# Patient Record
Sex: FEMALE | Race: WHITE | URBAN - METROPOLITAN AREA
[De-identification: names, ages, dates, MRNs, and addresses within clinical notes are randomized per-mention and may not be internally consistent; named-entity substitution may affect disease eponyms.]

---

## 2020-07-22 ENCOUNTER — TELEPHONE (OUTPATIENT)
Dept: ENDOCRINOLOGY | Age: 60
End: 2020-07-22

## 2020-08-10 ENCOUNTER — TELEPHONE (OUTPATIENT)
Dept: ENDOCRINOLOGY | Age: 60
End: 2020-08-10

## 2020-08-10 RX ORDER — ASPIRIN 81 MG/1
81 TABLET ORAL DAILY
COMMUNITY

## 2020-08-10 RX ORDER — CHLORAL HYDRATE 500 MG
2 CAPSULE ORAL DAILY
COMMUNITY

## 2020-08-10 RX ORDER — ATORVASTATIN CALCIUM 20 MG/1
TABLET, FILM COATED ORAL
COMMUNITY
Start: 2020-07-21

## 2020-08-10 SDOH — HEALTH STABILITY: MENTAL HEALTH: HOW OFTEN DO YOU HAVE A DRINK CONTAINING ALCOHOL?: 2-4 TIMES A MONTH

## 2020-09-02 ENCOUNTER — OFFICE VISIT (OUTPATIENT)
Dept: ENDOCRINOLOGY | Age: 60
End: 2020-09-02
Payer: COMMERCIAL

## 2020-09-02 VITALS
WEIGHT: 120.4 LBS | HEIGHT: 64 IN | SYSTOLIC BLOOD PRESSURE: 95 MMHG | BODY MASS INDEX: 20.55 KG/M2 | DIASTOLIC BLOOD PRESSURE: 62 MMHG

## 2020-09-02 PROBLEM — M81.0 OSTEOPOROSIS, POSTMENOPAUSAL: Status: ACTIVE | Noted: 2020-09-02

## 2020-09-02 PROCEDURE — 99205 OFFICE O/P NEW HI 60 MIN: CPT | Performed by: INTERNAL MEDICINE

## 2020-09-02 RX ORDER — ALENDRONATE SODIUM 70 MG/1
70 TABLET ORAL WEEKLY
Qty: 12 TABLET | Refills: 4 | Status: SHIPPED | OUTPATIENT
Start: 2020-09-02

## 2020-09-02 NOTE — LETTER
[facility]  [facility] [address] [facility], [address] [place] [address],[place] [address]  Phone [phone]  Fax [phone]         2020         King Bowden NP                            Re:  France Scott,  1960    Dear Ms. Mccann Phlegm:     Thank you for asking me to see France Chavez in consultation. As you know, Ms. Ranjit Paulino is a 56 y.o. woman found to have osteoporosis in 2018. BMD decreased slightly 6158-7473. We reviewed life-style issues (calcium, vitamin D and physical activity). I have ordered some diagnostic tests and will be back in touch when I have the results. Without effective treatment, fracture risk is high. We discussed long-term treatment options. She selected alendronate 70 mg weekly. Rx sent, dosing instructions reviewed. Enclosed is a copy of my consultation note. Please let me know if you have any questions. Sincerely,    Lauren Fonseca. Erwin SMITH     Encl.  Copy of consult note

## 2020-09-02 NOTE — PROGRESS NOTES
adult height 65. 1.25 height loss. Usual weight 120#. No recent significant change in weight. PHYSICAL EXAMINATION. GENERAL. Well-nourished, well-developed, normally proportioned adult. MENTAL STATUS. Pleasant mood. Oriented to time, place, and person. ORAL. Teeth appear to be in good condition. SKIN. Normal texture and turgor. LUNGS. Clear to auscultation. Breath sounds normal.  HEART. Heart sounds normal, no murmur or gallop. MUSCULOSKELETAL. The examination included inspection/palpation (any misalignment, asymmetry, crepitation, defects, tenderness, masses, effusions is noted), assessment of range of motion (any presence of pain, crepitation, contracture is noted), assessment of stability (any dislocation, subluxation, laxity is noted), assessment of muscle strength and tone (any atrophy or abnormal movements is noted). Pelvis appears normal.  Scoliosis. No spine tenderness to palpation or percussion. One finger space on the left, two on the right, between ribs and pelvis. Gait steady without assistance. NEUROLOGICAL. Able to rise from chair without using arms. No apparent motor or sensory deficit. Coordination appears normal     BONE DENSITY. Most recent done at [employer] equipment. They deleted L4 in 2020 but I think vertebral levels were mislabeled. T-scores   Initial study: 01/04/2018 L1-L3 -2.5 right fem. neck -2.8   Current study: 06/20/2020 L1-L3 -2.8  right fem. neck -3.0     The table below shows bone mineral density (grams/cm2), the appropriate measure for comparing serial scans. A significant increase or decrease is based on precision studies done at our center according to the ISCD protocol with a least significant change of 0.030 g/cm2. PA spine Proximal Femur (right)   Date L1-L3 Fem. neck Trochanter Total hip   01/04/2018 0.744 0.534 0.487 0.677   06/20/2020 0.714 0.522 0.450 0.651     Labs: 06/2020 Ca 9.6 Cr 0.8.   Imaging: DXA printouts reviewed. ASSESSMENT. Osteoporosis, bone density lower than desirable. In the spine, BMD decreased 6032-0504. Without effective treatment, fracture risk is high. DIAGNOSTIC PLANS. In 2 weeks (or longer), on appropriate calcium intake,  24-hour urine for calcium, creatinine and sodium. I will be in touch with the patient to review lab results. THERAPEUTIC PLANS. Calcium, target 1200 mg daily; we discussed recommended calcium intake and the effects of excessive calcium intake from supplements. I provided a handout with information about how to calculate daily calcium intake. Advised to stop calcium supplements. Advised to either should either stop the multivitamin or change to one without calcium. Vitamin D, advised to add vitamin D 2000 IU/d to make up for what will be lost when she stops calcium and MVI. Exercise, Recommended weight-bearing exercise (walking or equivalent) 30-40 minutes per session, 3 or 4 sessions a week and resistance exercise. Advised to take care to avoid injury (high impact, falling, etc). We discussed avoiding activities that place compressive forces on the spine; specifically pushing, pulling, bending, lifting and twisting to help prevent vertebral fractures. Pharmacologic therapy, we discussed long-term treatment options for osteoporosis that have evidence for broad spectrum anti-fracture efficacy (reduce the risk of vertebral, hip and other nonvertebral fractures); alendronate (Fosamax), zoledronate (Reclast) and Prolia (denosumab). I explained dosing instructions, the mechanism of action, side effects (which are uncommon) and safety concerns (which are rare). I would also consider risedronate (Actonel) but it is rarely covered by insurance. She selected alendronate 70 mg weekly. Rx sent, dosing instructions reviewed. Return appointment with DXA in 1 year. I spent 60 minutes face to face with this patient.  Over 50% of that time was spent on counseling and care coordination. See assessment and plan for counseling and care coordination details. Stephanie Hood MD, Director, [facility] ([facility]) Osteoporosis and Bone Health Services    CC:  Tonia Paris NP

## 2020-09-25 ENCOUNTER — HOSPITAL ENCOUNTER (OUTPATIENT)
Age: 60
Setting detail: SPECIMEN
Discharge: HOME | End: 2020-09-25
Payer: COMMERCIAL

## 2020-09-25 LAB
24HR URINE VOLUME (ML): 1400 ML
CALCIUM 24 HOUR URINE: 165 MG/24 HR (ref 42–353)
CREATININE 24 HOUR URINE: 0.9 G/24HR (ref 0.6–1.5)
SODIUM 24 HOUR URINE: 112 MMOL/24 HR (ref 40–220)

## 2020-09-25 PROCEDURE — 84300 ASSAY OF URINE SODIUM: CPT

## 2020-09-25 PROCEDURE — 82340 ASSAY OF CALCIUM IN URINE: CPT

## 2022-01-06 ENCOUNTER — TRANSCRIBE ORDERS (OUTPATIENT)
Dept: ADMINISTRATIVE | Facility: HOSPITAL | Age: 62
End: 2022-01-06

## 2022-01-06 DIAGNOSIS — Z12.31 VISIT FOR SCREENING MAMMOGRAM: Primary | ICD-10-CM

## 2022-02-24 ENCOUNTER — HOSPITAL ENCOUNTER (OUTPATIENT)
Dept: MAMMOGRAPHY | Facility: HOSPITAL | Age: 62
Discharge: HOME | End: 2022-02-24
Admitting: STUDENT IN AN ORGANIZED HEALTH CARE EDUCATION/TRAINING PROGRAM

## 2022-02-24 ENCOUNTER — APPOINTMENT (OUTPATIENT)
Dept: OTHER | Facility: HOSPITAL | Age: 62
End: 2022-02-24

## 2022-02-24 DIAGNOSIS — Z12.31 VISIT FOR SCREENING MAMMOGRAM: ICD-10-CM

## 2022-02-24 PROCEDURE — 77067 SCR MAMMO BI INCL CAD: CPT | Performed by: RADIOLOGY

## 2022-02-24 PROCEDURE — 77067 SCR MAMMO BI INCL CAD: CPT

## 2022-02-24 PROCEDURE — 77063 BREAST TOMOSYNTHESIS BI: CPT | Performed by: RADIOLOGY

## 2022-02-24 PROCEDURE — 77063 BREAST TOMOSYNTHESIS BI: CPT

## 2022-03-09 ENCOUNTER — APPOINTMENT (OUTPATIENT)
Dept: MAMMOGRAPHY | Facility: HOSPITAL | Age: 62
End: 2022-03-09

## 2022-03-31 ENCOUNTER — TRANSCRIBE ORDERS (OUTPATIENT)
Dept: ADMINISTRATIVE | Facility: HOSPITAL | Age: 62
End: 2022-03-31

## 2022-03-31 DIAGNOSIS — N95.1 MENOPAUSAL STATE: Primary | ICD-10-CM

## 2022-04-18 RX ORDER — ALENDRONATE SODIUM 70 MG/1
70 TABLET ORAL WEEKLY
Qty: 12 TABLET | Refills: 4 | OUTPATIENT
Start: 2022-04-18

## 2022-07-21 ENCOUNTER — HOSPITAL ENCOUNTER (OUTPATIENT)
Dept: BONE DENSITY | Facility: HOSPITAL | Age: 62
Discharge: HOME | End: 2022-07-21

## 2022-07-21 DIAGNOSIS — N95.1 MENOPAUSAL STATE: ICD-10-CM

## 2022-07-21 PROCEDURE — 77080 DXA BONE DENSITY AXIAL: CPT

## 2022-09-25 ENCOUNTER — APPOINTMENT (OUTPATIENT)
Dept: CT IMAGING | Facility: HOSPITAL | Age: 62
End: 2022-09-25

## 2022-09-25 ENCOUNTER — APPOINTMENT (OUTPATIENT)
Dept: MRI IMAGING | Facility: HOSPITAL | Age: 62
End: 2022-09-25

## 2022-09-25 ENCOUNTER — HOSPITAL ENCOUNTER (EMERGENCY)
Facility: HOSPITAL | Age: 62
Discharge: HOME | End: 2022-09-26
Attending: EMERGENCY MEDICINE | Admitting: EMERGENCY MEDICINE

## 2022-09-25 DIAGNOSIS — Z90.710 HISTORY OF TOTAL HYSTERECTOMY: ICD-10-CM

## 2022-09-25 DIAGNOSIS — F41.9 ANXIETY: ICD-10-CM

## 2022-09-25 DIAGNOSIS — G89.29 CHRONIC PELVIC PAIN IN FEMALE: Primary | ICD-10-CM

## 2022-09-25 DIAGNOSIS — Z87.39 HISTORY OF OSTEOPOROSIS: ICD-10-CM

## 2022-09-25 DIAGNOSIS — G47.9 SLEEP DISTURBANCE: ICD-10-CM

## 2022-09-25 DIAGNOSIS — Z86.39 HISTORY OF HYPERLIPIDEMIA: ICD-10-CM

## 2022-09-25 DIAGNOSIS — M51.36 DEGENERATIVE DISC DISEASE, LUMBAR: ICD-10-CM

## 2022-09-25 DIAGNOSIS — R10.2 CHRONIC PELVIC PAIN IN FEMALE: Primary | ICD-10-CM

## 2022-09-25 DIAGNOSIS — Z87.440 HISTORY OF UTI: ICD-10-CM

## 2022-09-25 LAB
ALBUMIN SERPL-MCNC: 4.6 G/DL (ref 3.5–5.2)
ALBUMIN/GLOB SERPL: 1.8 G/DL
ALP SERPL-CCNC: 68 U/L (ref 39–117)
ALT SERPL W P-5'-P-CCNC: 23 U/L (ref 1–33)
ANION GAP SERPL CALCULATED.3IONS-SCNC: 10 MMOL/L (ref 5–15)
AST SERPL-CCNC: 33 U/L (ref 1–32)
BACTERIA UR QL AUTO: ABNORMAL /HPF
BASOPHILS # BLD AUTO: 0.03 10*3/MM3 (ref 0–0.2)
BASOPHILS NFR BLD AUTO: 0.7 % (ref 0–1.5)
BILIRUB SERPL-MCNC: 0.2 MG/DL (ref 0–1.2)
BILIRUB UR QL STRIP: NEGATIVE
BUN SERPL-MCNC: 13 MG/DL (ref 8–23)
BUN/CREAT SERPL: 17.1 (ref 7–25)
CALCIUM SPEC-SCNC: 9.7 MG/DL (ref 8.6–10.5)
CHLORIDE SERPL-SCNC: 99 MMOL/L (ref 98–107)
CLARITY UR: ABNORMAL
CLUE CELLS SPEC QL WET PREP: ABNORMAL
CO2 SERPL-SCNC: 31 MMOL/L (ref 22–29)
COLOR UR: YELLOW
CREAT SERPL-MCNC: 0.76 MG/DL (ref 0.57–1)
DEPRECATED RDW RBC AUTO: 40.6 FL (ref 37–54)
EGFRCR SERPLBLD CKD-EPI 2021: 87.6 ML/MIN/1.73
EOSINOPHIL # BLD AUTO: 0.07 10*3/MM3 (ref 0–0.4)
EOSINOPHIL NFR BLD AUTO: 1.6 % (ref 0.3–6.2)
ERYTHROCYTE [DISTWIDTH] IN BLOOD BY AUTOMATED COUNT: 11.9 % (ref 12.3–15.4)
GLOBULIN UR ELPH-MCNC: 2.6 GM/DL
GLUCOSE SERPL-MCNC: 110 MG/DL (ref 65–99)
GLUCOSE UR STRIP-MCNC: NEGATIVE MG/DL
HCT VFR BLD AUTO: 42 % (ref 34–46.6)
HGB BLD-MCNC: 14 G/DL (ref 12–15.9)
HGB UR QL STRIP.AUTO: ABNORMAL
HOLD SPECIMEN: NORMAL
HYALINE CASTS UR QL AUTO: ABNORMAL /LPF
HYDATID CYST SPEC WET PREP: ABNORMAL
IMM GRANULOCYTES # BLD AUTO: 0.01 10*3/MM3 (ref 0–0.05)
IMM GRANULOCYTES NFR BLD AUTO: 0.2 % (ref 0–0.5)
KETONES UR QL STRIP: NEGATIVE
KOH PREP NAIL: NORMAL
LEUKOCYTE ESTERASE UR QL STRIP.AUTO: ABNORMAL
LYMPHOCYTES # BLD AUTO: 0.93 10*3/MM3 (ref 0.7–3.1)
LYMPHOCYTES NFR BLD AUTO: 21.2 % (ref 19.6–45.3)
MCH RBC QN AUTO: 30.8 PG (ref 26.6–33)
MCHC RBC AUTO-ENTMCNC: 33.3 G/DL (ref 31.5–35.7)
MCV RBC AUTO: 92.5 FL (ref 79–97)
MONOCYTES # BLD AUTO: 0.58 10*3/MM3 (ref 0.1–0.9)
MONOCYTES NFR BLD AUTO: 13.2 % (ref 5–12)
NEUTROPHILS NFR BLD AUTO: 2.77 10*3/MM3 (ref 1.7–7)
NEUTROPHILS NFR BLD AUTO: 63.1 % (ref 42.7–76)
NITRITE UR QL STRIP: NEGATIVE
NRBC BLD AUTO-RTO: 0 /100 WBC (ref 0–0.2)
PH UR STRIP.AUTO: 7 [PH] (ref 5–8)
PLATELET # BLD AUTO: 195 10*3/MM3 (ref 140–450)
PMV BLD AUTO: 10 FL (ref 6–12)
POTASSIUM SERPL-SCNC: 4.7 MMOL/L (ref 3.5–5.2)
PROT SERPL-MCNC: 7.2 G/DL (ref 6–8.5)
PROT UR QL STRIP: NEGATIVE
RBC # BLD AUTO: 4.54 10*6/MM3 (ref 3.77–5.28)
RBC # UR STRIP: ABNORMAL /HPF
REF LAB TEST METHOD: ABNORMAL
SODIUM SERPL-SCNC: 140 MMOL/L (ref 136–145)
SP GR UR STRIP: 1.02 (ref 1–1.03)
SQUAMOUS #/AREA URNS HPF: ABNORMAL /HPF
T VAGINALIS SPEC QL WET PREP: ABNORMAL
UROBILINOGEN UR QL STRIP: ABNORMAL
WBC # UR STRIP: ABNORMAL /HPF
WBC NRBC COR # BLD: 4.39 10*3/MM3 (ref 3.4–10.8)
WBC SPEC QL WET PREP: ABNORMAL
WHOLE BLOOD HOLD COAG: NORMAL
WHOLE BLOOD HOLD SPECIMEN: NORMAL
YEAST GENITAL QL WET PREP: ABNORMAL

## 2022-09-25 PROCEDURE — 99284 EMERGENCY DEPT VISIT MOD MDM: CPT

## 2022-09-25 PROCEDURE — 72158 MRI LUMBAR SPINE W/O & W/DYE: CPT

## 2022-09-25 PROCEDURE — 87077 CULTURE AEROBIC IDENTIFY: CPT | Performed by: PHYSICIAN ASSISTANT

## 2022-09-25 PROCEDURE — 25010000002 KETOROLAC TROMETHAMINE PER 15 MG: Performed by: PHYSICIAN ASSISTANT

## 2022-09-25 PROCEDURE — 87086 URINE CULTURE/COLONY COUNT: CPT | Performed by: PHYSICIAN ASSISTANT

## 2022-09-25 PROCEDURE — 74176 CT ABD & PELVIS W/O CONTRAST: CPT

## 2022-09-25 PROCEDURE — 85025 COMPLETE CBC W/AUTO DIFF WBC: CPT

## 2022-09-25 PROCEDURE — 36415 COLL VENOUS BLD VENIPUNCTURE: CPT

## 2022-09-25 PROCEDURE — A9577 INJ MULTIHANCE: HCPCS | Performed by: EMERGENCY MEDICINE

## 2022-09-25 PROCEDURE — 80053 COMPREHEN METABOLIC PANEL: CPT

## 2022-09-25 PROCEDURE — 96374 THER/PROPH/DIAG INJ IV PUSH: CPT

## 2022-09-25 PROCEDURE — 81001 URINALYSIS AUTO W/SCOPE: CPT | Performed by: EMERGENCY MEDICINE

## 2022-09-25 PROCEDURE — 87220 TISSUE EXAM FOR FUNGI: CPT | Performed by: PHYSICIAN ASSISTANT

## 2022-09-25 PROCEDURE — 0 GADOBENATE DIMEGLUMINE 529 MG/ML SOLUTION: Performed by: EMERGENCY MEDICINE

## 2022-09-25 PROCEDURE — 87210 SMEAR WET MOUNT SALINE/INK: CPT | Performed by: PHYSICIAN ASSISTANT

## 2022-09-25 RX ORDER — MULTIVITAMIN WITH IRON
250 TABLET ORAL DAILY
COMMUNITY

## 2022-09-25 RX ORDER — SODIUM CHLORIDE 0.9 % (FLUSH) 0.9 %
10 SYRINGE (ML) INJECTION AS NEEDED
Status: DISCONTINUED | OUTPATIENT
Start: 2022-09-25 | End: 2022-09-26 | Stop reason: HOSPADM

## 2022-09-25 RX ORDER — KETOROLAC TROMETHAMINE 30 MG/ML
30 INJECTION, SOLUTION INTRAMUSCULAR; INTRAVENOUS ONCE
Status: COMPLETED | OUTPATIENT
Start: 2022-09-25 | End: 2022-09-25

## 2022-09-25 RX ADMIN — KETOROLAC TROMETHAMINE 30 MG: 30 INJECTION, SOLUTION INTRAMUSCULAR; INTRAVENOUS at 21:52

## 2022-09-25 RX ADMIN — GADOBENATE DIMEGLUMINE 10 ML: 529 INJECTION, SOLUTION INTRAVENOUS at 23:36

## 2022-09-26 VITALS
WEIGHT: 117 LBS | TEMPERATURE: 97.8 F | BODY MASS INDEX: 19.97 KG/M2 | HEART RATE: 60 BPM | DIASTOLIC BLOOD PRESSURE: 76 MMHG | RESPIRATION RATE: 17 BRPM | HEIGHT: 64 IN | SYSTOLIC BLOOD PRESSURE: 132 MMHG | OXYGEN SATURATION: 94 %

## 2022-09-26 LAB — BACTERIA SPEC AEROBE CULT: ABNORMAL

## 2022-09-26 RX ORDER — KETOROLAC TROMETHAMINE 10 MG/1
10 TABLET, FILM COATED ORAL EVERY 6 HOURS PRN
Qty: 21 TABLET | Refills: 0 | Status: SHIPPED | OUTPATIENT
Start: 2022-09-26

## 2022-09-26 RX ORDER — HYDROXYZINE PAMOATE 25 MG/1
25 CAPSULE ORAL EVERY 6 HOURS PRN
Qty: 30 CAPSULE | Refills: 0 | Status: SHIPPED | OUTPATIENT
Start: 2022-09-26

## 2022-09-26 NOTE — DISCHARGE INSTRUCTIONS
ER evaluation reveals normal CBC and chemistries.  Urinalysis reveals 13-20 white blood cells and moderate leukocytes.  Repeat urine culture is in process and patient needs to complete course of cefuroxime as recently prescribed.  CT the abdomen/pelvis without contrast showed no acute abnormalities other than moderate stool throughout the colon.  MRI of the lumbar spine with and without contrast revealed degenerative disc disease but no significant spinal canal narrowing.  Pelvic cultures revealed no clue cells and no yeast infection.  Rx for Toradol 10 mg by mouth every 6 hours as needed for pain/inflammation as well as Vistaril 25 mg by mouth every 6 hours as needed for anxiety.  Continue with all scheduled follow-up in the near future including urology follow-up and appointment made for nerve block, as well as nerve conduction study.  Continue with all other current medical management.  Do not take ibuprofen or naproxen while taking oral Toradol.  Return to the ER if any worsening symptoms.

## 2023-05-30 ENCOUNTER — TELEPHONE (OUTPATIENT)
Dept: PEDIATRICS | Facility: OTHER | Age: 63
End: 2023-05-30

## 2023-05-30 NOTE — TELEPHONE ENCOUNTER
Unfortunately, I am not taking new patients at this time, unless they are family members of my  established patients.  Please refer her to one of the other providers that are taking new patients.

## 2023-05-30 NOTE — TELEPHONE ENCOUNTER
Caller: Park Galvez    Relationship to patient: Self    Best call back number: [phone]    Chief complaint: BLOOD WORK, HIGH CHOLESTEROL, OSTEOPOROSIS, PAIN ISSUES IN LOWER BACK MAKING IT HARD TO SIT    Type of visit: NEW PATIENT     Requested date: SOMETIME THIS SUMMER     If rescheduling, when is the original appointment:     Additional notes: PATIENT STATES SHE WAS REFERRED BY A PATIENT OF DR. PALMER'S NAMED [facility] AND WOULD LIKE TO ASK THAT DR. PALMER CONSIDER TAKING HER AS A NEW PATIENT.

## 2023-06-01 NOTE — TELEPHONE ENCOUNTER
Patient states that she wants to do research on the providers in our office before making an appt here.

## 2023-09-26 ENCOUNTER — OFFICE VISIT (OUTPATIENT)
Dept: INTERNAL MEDICINE | Facility: CLINIC | Age: 63
End: 2023-09-26
Payer: MEDICARE

## 2023-09-26 VITALS
OXYGEN SATURATION: 94 % | SYSTOLIC BLOOD PRESSURE: 114 MMHG | WEIGHT: 123.8 LBS | DIASTOLIC BLOOD PRESSURE: 70 MMHG | HEART RATE: 87 BPM | HEIGHT: 63 IN | TEMPERATURE: 97.5 F | BODY MASS INDEX: 21.93 KG/M2

## 2023-09-26 DIAGNOSIS — E78.00 HYPERCHOLESTEROLEMIA: Primary | Chronic | ICD-10-CM

## 2023-09-26 DIAGNOSIS — M53.3 COCCYDYNIA: Chronic | ICD-10-CM

## 2023-09-26 DIAGNOSIS — G60.9 IDIOPATHIC PERIPHERAL NEUROPATHY: Chronic | ICD-10-CM

## 2023-09-26 DIAGNOSIS — R39.15 URINARY URGENCY: ICD-10-CM

## 2023-09-26 DIAGNOSIS — Z23 NEED FOR VACCINATION: ICD-10-CM

## 2023-09-26 DIAGNOSIS — M81.0 AGE-RELATED OSTEOPOROSIS WITHOUT CURRENT PATHOLOGICAL FRACTURE: Chronic | ICD-10-CM

## 2023-09-26 PROBLEM — N39.41 URGE INCONTINENCE OF URINE: Chronic | Status: RESOLVED | Noted: 2023-09-26 | Resolved: 2023-09-26

## 2023-09-26 PROBLEM — N39.41 URGE INCONTINENCE OF URINE: Chronic | Status: ACTIVE | Noted: 2023-09-26

## 2023-09-26 PROBLEM — H18.513 FUCHS' CORNEAL DYSTROPHY OF BOTH EYES: Chronic | Status: ACTIVE | Noted: 2023-09-26

## 2023-09-26 RX ORDER — GABAPENTIN 300 MG/1
300 CAPSULE ORAL 3 TIMES DAILY
COMMUNITY
End: 2023-09-26 | Stop reason: SDUPTHER

## 2023-09-26 RX ORDER — GABAPENTIN 300 MG/1
300 CAPSULE ORAL 3 TIMES DAILY
Qty: 90 CAPSULE | Refills: 2 | Status: SHIPPED | OUTPATIENT
Start: 2023-09-26

## 2023-09-26 RX ORDER — MAGNESIUM 200 MG
TABLET ORAL EVERY 24 HOURS
COMMUNITY
End: 2023-09-26 | Stop reason: SDUPTHER

## 2023-09-26 RX ORDER — MULTIPLE VITAMINS W/ MINERALS TAB 9MG-400MCG
1 TAB ORAL DAILY
COMMUNITY
End: 2023-09-26 | Stop reason: SDUPTHER

## 2023-09-26 NOTE — ASSESSMENT & PLAN NOTE
She will continue alendronate weekly. She will continue dietary calcium trying to get about 1000 mg per day. If vitamin D level is around 50 or higher, I will recommend taking 1000 units of vitamin D3 daily. If it is lower than 50, we will recommend taking vitamin D3 2000 units daily. She will continue weightbearing exercises.

## 2023-09-26 NOTE — ASSESSMENT & PLAN NOTE
She will continue physical therapy at [facility]. She will continue doing the home exercises as well. She will continue to be physically active.

## 2023-09-26 NOTE — PROGRESS NOTES
Park Galvez  1960  [id number]  Patient Care Team:  Alta López MD as PCP - General (Internal Medicine)  Luis Cuello MD as Consulting Physician (Ophthalmology)    Park Galvez is a 63 y.o. here today to establish care.    Previously under the care of a doctor in [place].    Chief Complaint   Patient presents with    [facility]    Tailbone Pain       HPI:     he patient presents today for a new patient visit.    Urinary urgency  The patient states that when she moved here about 2 years ago, she started having trouble initially with urinary urgency. She was referred to a gynecologist who started her on a hormone patch. She is s/p hysterectomy and oophorectomy in 03/2022. She consulted with a different gynecologist who advised her not to be on a patch with her age and started her on a vaginal cream, which has helped. She does not have urinary urgency anymore. The patient has never had incontinence.    Osteoporosis    The patient is not taking any calcium right now and the only vitamin D she is taking is in her multivitamin. She was advised by her previous provider to take calcium because of osteoporosis. She was then referred to an endocrinologist who prescribed Fosamax. She was then  told that she did not need the calcium after a 24-hour urine test showed her calcium levels within normal limits. She takes a multivitamin that has some calcium. The patient has been on alendronate once a week for 3 years. Her last DEXA scan was in 07/2023. She is not seeing anyone for her osteoporosis. She denies any stomach upset or heartburn with the alendronate. The patient is mindful about not laying down for 30 minutes after she takes it. She has some calcium in her diet like yogurt and cottage cheese milk every day. The patient is doing some resistance exercises.    Hyperlipidemia  She has been on Lipitor for 10 years. The patient denies any side effects with the Lipitor. She is taking omega fish  oil. Her last total cholesterol was 191 mg/dL, triglycerides were 76 mg/dL, HDL was good. Her LDL was 98 mg/dL.    Fuchs' disease  She sees a cornea specialist in [place]. 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 She is monitoring her left eye closely, but she feels like overall it is well managed. She has focus in both of the eyes. The patient had a cataract removed from her right eye. She has a cataract in her left eye, but it is mild. The patient sees Dr. Luis Johnson for this.    Pudendal neuralgia   She was treated for pudendal neuralgia. The patient felt relief at first, but then it did not hold. She was started on gabapentin. The patient was on it for about 3 months and thought it was doing better; however, when she discontinued it her pain returned. She has resumed gabapentin 3 times a day and notes that it helps her sleep.     Tingling  She has tingling in her feet. The patient was sent to have a nerve conduction study and was told she had mild neuropathy. She is not diabetic. The patient is not overweight. She has some disc disease. The patient denies any pain in her feet.    Health maintenance  The patient plans to get the influenza vaccine at her pharmacy.  She is due for the pneumonia vaccine.  The patient is up to date on the Tdap vaccine.  She had a mammogram in 2022 but has not had one yet this year.  She had a colonoscopy in 01/2023 by Dr. Janna Garza. The patient was advised to repeat in 10 years.                  Past Medical History:   Diagnosis Date    Allergic     Macrobid    Cataract     Removal right eye    Hyperlipidemia     Taking Lipitor    Neuromuscular disorder     Pelvic floor trt    Osteopenia     Taking Fosamax 3 yrs     Past Surgical History:   Procedure Laterality Date    COLONOSCOPY  Jan 2023    Nml    CORNEAL TRANSPLANT Right 2017    PARTIAL REPLACEMENT    HERNIA REPAIR      HYSTERECTOMY  03/2022    and BSO    INGUINAL HERNIA REPAIR       Sx tepair    UMBILICAL HERNIA REPAIR      Sx repair     Family History   Problem Relation Age of Onset    Miscarriages / Stillbirths Mother     Dementia Mother     Heart disease Father     Hyperlipidemia Father     Vision loss Father         Glaucoma    Hypertension Sister     Heart failure Sister     Sleep apnea Sister     Stroke Brother     Stroke Maternal Grandfather     Lung disease Maternal Grandfather     Obesity Paternal Grandmother     Pulmonary embolism Paternal Grandmother     Breast cancer Neg Hx     Ovarian cancer Neg Hx      Social History     Tobacco Use   Smoking Status Never   Smokeless Tobacco Never     Allergies   Allergen Reactions    Macrobid [Nitrofurantoin] Hives    Amitriptyline Other (See Comments)     Shaky and insomnia. Sleep distrubution.    Shaky and insomnia. Sleep distrubution.     Shaky and insomnia. Sleep distrubution.    Oxycodone-Acetaminophen Other (See Comments)     Other reaction(s): Headache   Pt stated more nausea  feeling than headache and had to get medication for the nausea       Current Outpatient Medications:     alendronate (FOSAMAX) 70 MG tablet, Take 1 tablet by mouth Every 7 (Seven) Days., Disp: , Rfl:     atorvastatin (LIPITOR) 20 MG tablet, Take 1 tablet by mouth Daily., Disp: , Rfl:     estradiol (ESTRACE) 0.1 MG/GM vaginal cream, Insert 2 g into the vagina 2 (Two) Times a Week., Disp: , Rfl:     fluorometholone (FML) 0.1 % ophthalmic suspension, Administer 1 drop to the right eye Daily., Disp: , Rfl:     gabapentin (NEURONTIN) 300 MG capsule, Take 1 capsule by mouth 3 (Three) Times a Day., Disp: 90 capsule, Rfl: 2    Magnesium 250 MG tablet, Take 1 tablet by mouth Daily., Disp: , Rfl:     multivitamin (THERAGRAN) tablet tablet, Take 1 tablet by mouth Daily., Disp: , Rfl:     Omega-3 Fatty Acids (fish oil) 1000 MG capsule capsule, Take 2 capsules by mouth Daily., Disp: , Rfl:     Review of Systems    /70 (BP Location: Left arm, Patient Position: Sitting,  "Cuff Size: Adult)   Pulse 87   Temp 97.5 °F (36.4 °C) (Infrared)   Ht 160.9 cm (63.35\")   Wt 56.2 kg (123 lb 12.8 oz)   SpO2 94%   BMI 21.69 kg/m²     Physical Exam  Constitutional:       General: She is not in acute distress.     Appearance: She is well-developed.   HENT:      Head: Normocephalic and atraumatic.      Right Ear: External ear normal.      Left Ear: External ear normal.   Eyes:      Conjunctiva/sclera: Conjunctivae normal.      Pupils: Pupils are equal, round, and reactive to light.   Cardiovascular:      Rate and Rhythm: Normal rate and regular rhythm.      Heart sounds: No murmur heard.  Pulmonary:      Effort: Pulmonary effort is normal. No respiratory distress.      Breath sounds: Normal breath sounds. No wheezing.   Abdominal:      General: Bowel sounds are normal. There is no distension.      Palpations: Abdomen is soft.      Tenderness: There is no abdominal tenderness.   Musculoskeletal:         General: Normal range of motion.      Cervical back: Normal range of motion and neck supple.   Lymphadenopathy:      Cervical: No cervical adenopathy.   Skin:     General: Skin is warm and dry.   Neurological:      Mental Status: She is alert and oriented to person, place, and time.      Cranial Nerves: No cranial nerve deficit.     Results Review:  None         Assessment/Plan:  Patient Instructions   Problem List Items Addressed This Visit          Cardiac and Vasculature    Hypercholesterolemia - Primary (Chronic)    Overview     Taking atorvastatin 20mg every evening.         Current Assessment & Plan     She will continue atorvastatin every evening. Continue healthy low-fat diet and regular exercise. She will continue 2000 mg of omega-3 fatty acids daily.         Relevant Medications    atorvastatin (LIPITOR) 20 MG tablet    Other Relevant Orders    CBC & Differential    Comprehensive Metabolic Panel    Lipid Panel    TSH    Vitamin B12       Genitourinary and Reproductive     Urinary " urgency (Chronic)    Overview     Improved symptoms with vaginal estradiol cream and PT at [facility].         Current Assessment & Plan     She will continue vaginal estradiol cream 2 to 3 times per week. She will continue physical therapy at [facility].         Relevant Orders    Microalbumin / Creatinine Urine Ratio - Urine, Clean Catch    Urinalysis With Microscopic - Urine, Clean Catch       Musculoskeletal and Injuries    Age-related osteoporosis without current pathological fracture (Chronic)    Overview     7/2022 DEXA showed some improvement with lowest T score in the femoral neck at -2.9.  Lowest T score in the lumbar spine is -2.7.    Taking alendronate since 2020.          Current Assessment & Plan     She will continue alendronate weekly. She will continue dietary calcium trying to get about 1000 mg per day. If vitamin D level is around 50 or higher, I will recommend taking 1000 units of vitamin D3 daily. If it is lower than 50, we will recommend taking vitamin D3 2000 units daily. She will continue weightbearing exercises.         Relevant Orders    Vitamin D,25-Hydroxy       Neuro    Coccydynia (Chronic)    Overview     Patient is doing physical therapy at [facility].         Current Assessment & Plan     She will continue physical therapy at [facility]. She will continue doing the home exercises as well. She will continue to be physically active.         Relevant Medications    gabapentin (NEURONTIN) 300 MG capsule    Idiopathic peripheral neuropathy (Chronic)    Overview     Mild numbness and tingling in the feet in the mornings.  She had an EMG/NCV a few years ago which did show mild peripheral neuropathy.         Current Assessment & Plan     She will continue taking gabapentin 3 times per day. She will also continue physical therapy at [facility].          Other Visit Diagnoses       Need for vaccination        Relevant Orders    Pneumococcal Conjugate Vaccine 20-Valent All (Completed)                Diagnosis Plan   1. Hypercholesterolemia  CBC & Differential    Comprehensive Metabolic Panel    Lipid Panel    TSH    Vitamin B12      2. Age-related osteoporosis without current pathological fracture  Vitamin D,25-Hydroxy      3. Urinary urgency  Microalbumin / Creatinine Urine Ratio - Urine, Clean Catch    Urinalysis With Microscopic - Urine, Clean Catch      4. Coccydynia  gabapentin (NEURONTIN) 300 MG capsule      5. Idiopathic peripheral neuropathy        6. Need for vaccination  Pneumococcal Conjugate Vaccine 20-Valent All          There are no Patient Instructions on file for this visit.    Plan of care reviewed with patient at the conclusion of today's visit. Education was provided regarding diagnosis and management.  Patient verbalizes understanding of and agreement with management plan.    Return in about 3 months (around 12/26/2023) for welcome to medicare, Medicare Wellness.    Dictated Utilizing Dragon Dictation    I spent 47 minutes face to face with the patient, reviewing history and epic and on care everywhere, obtaining history, discussing symptoms, discussing diagnoses, discussing plan of treatment, ordering tests, discussing preventative measures, doing physical exam, and documenting in the chart.    Alta López MD      Transcribed from ambient dictation for Alta López MD by Nicolasa Reed.  09/26/23   18:46 EDT    Patient or patient representative verbalized consent to the visit recording.  I have personally performed the services described in this document as transcribed by the above individual, and it is both accurate and complete.

## 2023-09-26 NOTE — ASSESSMENT & PLAN NOTE
She will continue atorvastatin every evening. Continue healthy low-fat diet and regular exercise. She will continue 2000 mg of omega-3 fatty acids daily.

## 2023-09-26 NOTE — LETTER
[facility]  Vaccine Consent Form    Patient Name:  Park Galvez  Patient :  1960  [id number]      Vaccine(s) Ordered    Pneumococcal Conjugate Vaccine 20-Valent All        Screening Checklist  The following questions should be completed prior to vaccination. If you answer “yes” to any question, it does not necessarily mean you should not be vaccinated. It just means we may need to clarify or ask more questions. If a question is unclear, please ask your healthcare provider to explain it.    Yes No   Any fever or moderate to severe illness today (mild illness and/or antibiotic treatment are not contraindications)?     Do you have a history of a serious reaction to any previous vaccinations, such as anaphylaxis, encephalopathy within 7 days, Guillain-Burgoon syndrome within 6 weeks, seizure?     Have you received any live vaccine(s) in the past month (MMR, ZOILA)?     Do you have an anaphylactic allergy to latex (DTaP, DTaP-IPV, Hep A, Hep B, MenB, RV, Td, Tdap), baker’s yeast (Hep B, HPV), or gelatin (ZOILA, MMR)?     Do you have an anaphylactic allergy to neomycin (Rabies, ZOILA, MMR, IPV, Hep A), polymyxin B (IPV), or streptomycin (IPV)?      Any cancer, leukemia, AIDS, or other immune system disorder? (ZOILA, MMR, RV)     Do you have a parent, brother, or sister with an immune system problem (if immune competence of vaccine recipient clinically verified, can proceed)? (MMR, ZOILA)     Any recent steroid treatments for >2 weeks, chemotherapy, or radiation treatment? (ZOILA, MMR)     Have you received antibody-containing blood transfusions or IVIG in the past 11 months (recommended interval is dependent on product)? (MMR, ZOILA)     Have you taken antiviral drugs (acyclovir, famciclovir, valacyclovir) in the last 24 or 48 hours, respectively (ZOILA)?      Are you pregnant or planning to become pregnant within 1 month? (ZOILA, MMR, HPV, IPV, MenB; For hep B- refer to Engerix-B)     For infants, have you ever been told  your child has had intussusception or a medical emergency involving obstruction of the intestine (RV)? If not for an infant, can skip this question.         *Ordering Physician/APC should be consulted if “yes” is checked by the patient or guardian above.      I have received, read, and understand the Vaccine Information Statement (VIS) for each vaccine ordered above.  I have considered my health status as well as the health status of my close contacts.  I have taken the opportunity to discuss my vaccine questions with my health care provider.   I have requested that the ordered vaccine(s) be given to me.  I understand the benefits and risks of the vaccines.  I understand that I should remain in the clinic for 15 minutes after receiving the vaccine(s).  _________________________________________________________  Signature of Patient or Parent/Legal Guardian ____________________  Date

## 2023-09-26 NOTE — ASSESSMENT & PLAN NOTE
She will continue vaginal estradiol cream 2 to 3 times per week. She will continue physical therapy at [facility].

## 2023-09-27 NOTE — PATIENT INSTRUCTIONS
Patient Instructions  Problem List Items Addressed This Visit          Cardiac and Vasculature    Hypercholesterolemia - Primary (Chronic)    Overview     Taking atorvastatin 20mg every evening.         Current Assessment & Plan     She will continue atorvastatin every evening. Continue healthy low-fat diet and regular exercise. She will continue 2000 mg of omega-3 fatty acids daily.         Relevant Medications    atorvastatin (LIPITOR) 20 MG tablet    Other Relevant Orders    CBC & Differential    Comprehensive Metabolic Panel    Lipid Panel    TSH    Vitamin B12       Genitourinary and Reproductive     Urinary urgency (Chronic)    Overview     Improved symptoms with vaginal estradiol cream and PT at [facility].         Current Assessment & Plan     She will continue vaginal estradiol cream 2 to 3 times per week. She will continue physical therapy at [facility].         Relevant Orders    Microalbumin / Creatinine Urine Ratio - Urine, Clean Catch    Urinalysis With Microscopic - Urine, Clean Catch       Musculoskeletal and Injuries    Age-related osteoporosis without current pathological fracture (Chronic)    Overview     7/2022 DEXA showed some improvement with lowest T score in the femoral neck at -2.9.  Lowest T score in the lumbar spine is -2.7.    Taking alendronate since 2020.          Current Assessment & Plan     She will continue alendronate weekly. She will continue dietary calcium trying to get about 1000 mg per day. If vitamin D level is around 50 or higher, I will recommend taking 1000 units of vitamin D3 daily. If it is lower than 50, we will recommend taking vitamin D3 2000 units daily. She will continue weightbearing exercises.         Relevant Orders    Vitamin D,25-Hydroxy       Neuro    Coccydynia (Chronic)    Overview     Patient is doing physical therapy at [facility].         Current Assessment & Plan     She will continue physical therapy at [facility]. She will continue doing the home exercises as  well. She will continue to be physically active.         Relevant Medications    gabapentin (NEURONTIN) 300 MG capsule    Idiopathic peripheral neuropathy (Chronic)    Overview     Mild numbness and tingling in the feet in the mornings.  She had an EMG/NCV a few years ago which did show mild peripheral neuropathy.         Current Assessment & Plan     She will continue taking gabapentin 3 times per day. She will also continue physical therapy at [facility].          Other Visit Diagnoses       Need for vaccination        Relevant Orders    Pneumococcal Conjugate Vaccine 20-Valent All (Completed)

## 2023-10-02 ENCOUNTER — LAB (OUTPATIENT)
Dept: LAB | Facility: HOSPITAL | Age: 63
End: 2023-10-02
Payer: MEDICARE

## 2023-10-02 DIAGNOSIS — M81.0 AGE-RELATED OSTEOPOROSIS WITHOUT CURRENT PATHOLOGICAL FRACTURE: Chronic | ICD-10-CM

## 2023-10-02 DIAGNOSIS — R39.15 URINARY URGENCY: ICD-10-CM

## 2023-10-02 DIAGNOSIS — E78.00 HYPERCHOLESTEROLEMIA: ICD-10-CM

## 2023-10-02 LAB
25(OH)D3 SERPL-MCNC: 46 NG/ML (ref 30–100)
ALBUMIN SERPL-MCNC: 5 G/DL (ref 3.5–5.2)
ALBUMIN UR-MCNC: 1.4 MG/DL
ALBUMIN/GLOB SERPL: 1.8 G/DL
ALP SERPL-CCNC: 75 U/L (ref 39–117)
ALT SERPL W P-5'-P-CCNC: 21 U/L (ref 1–33)
ANION GAP SERPL CALCULATED.3IONS-SCNC: 9.8 MMOL/L (ref 5–15)
AST SERPL-CCNC: 30 U/L (ref 1–32)
BACTERIA UR QL AUTO: ABNORMAL /HPF
BASOPHILS # BLD AUTO: 0.02 10*3/MM3 (ref 0–0.2)
BASOPHILS NFR BLD AUTO: 0.4 % (ref 0–1.5)
BILIRUB SERPL-MCNC: 0.4 MG/DL (ref 0–1.2)
BILIRUB UR QL STRIP: NEGATIVE
BUN SERPL-MCNC: 16 MG/DL (ref 8–23)
BUN/CREAT SERPL: 17.6 (ref 7–25)
CALCIUM SPEC-SCNC: 9.9 MG/DL (ref 8.6–10.5)
CHLORIDE SERPL-SCNC: 102 MMOL/L (ref 98–107)
CHOLEST SERPL-MCNC: 215 MG/DL (ref 0–200)
CLARITY UR: CLEAR
CO2 SERPL-SCNC: 30.2 MMOL/L (ref 22–29)
COLOR UR: YELLOW
CREAT SERPL-MCNC: 0.91 MG/DL (ref 0.57–1)
CREAT UR-MCNC: 113.9 MG/DL
DEPRECATED RDW RBC AUTO: 39.9 FL (ref 37–54)
EGFRCR SERPLBLD CKD-EPI 2021: 70.2 ML/MIN/1.73
EOSINOPHIL # BLD AUTO: 0.07 10*3/MM3 (ref 0–0.4)
EOSINOPHIL NFR BLD AUTO: 1.5 % (ref 0.3–6.2)
ERYTHROCYTE [DISTWIDTH] IN BLOOD BY AUTOMATED COUNT: 11.7 % (ref 12.3–15.4)
GLOBULIN UR ELPH-MCNC: 2.8 GM/DL
GLUCOSE SERPL-MCNC: 90 MG/DL (ref 65–99)
GLUCOSE UR STRIP-MCNC: NEGATIVE MG/DL
HCT VFR BLD AUTO: 40.5 % (ref 34–46.6)
HDLC SERPL-MCNC: 62 MG/DL (ref 40–60)
HGB BLD-MCNC: 13.6 G/DL (ref 12–15.9)
HGB UR QL STRIP.AUTO: NEGATIVE
HYALINE CASTS UR QL AUTO: ABNORMAL /LPF
IMM GRANULOCYTES # BLD AUTO: 0.01 10*3/MM3 (ref 0–0.05)
IMM GRANULOCYTES NFR BLD AUTO: 0.2 % (ref 0–0.5)
KETONES UR QL STRIP: NEGATIVE
LDLC SERPL CALC-MCNC: 131 MG/DL (ref 0–100)
LDLC/HDLC SERPL: 2.07 {RATIO}
LEUKOCYTE ESTERASE UR QL STRIP.AUTO: ABNORMAL
LYMPHOCYTES # BLD AUTO: 1.36 10*3/MM3 (ref 0.7–3.1)
LYMPHOCYTES NFR BLD AUTO: 28.8 % (ref 19.6–45.3)
MCH RBC QN AUTO: 31.6 PG (ref 26.6–33)
MCHC RBC AUTO-ENTMCNC: 33.6 G/DL (ref 31.5–35.7)
MCV RBC AUTO: 94 FL (ref 79–97)
MICROALBUMIN/CREAT UR: 12.3 MG/G
MONOCYTES # BLD AUTO: 0.45 10*3/MM3 (ref 0.1–0.9)
MONOCYTES NFR BLD AUTO: 9.5 % (ref 5–12)
NEUTROPHILS NFR BLD AUTO: 2.82 10*3/MM3 (ref 1.7–7)
NEUTROPHILS NFR BLD AUTO: 59.6 % (ref 42.7–76)
NITRITE UR QL STRIP: NEGATIVE
NRBC BLD AUTO-RTO: 0 /100 WBC (ref 0–0.2)
PH UR STRIP.AUTO: 6 [PH] (ref 5–8)
PLATELET # BLD AUTO: 248 10*3/MM3 (ref 140–450)
PMV BLD AUTO: 10.7 FL (ref 6–12)
POTASSIUM SERPL-SCNC: 3.8 MMOL/L (ref 3.5–5.2)
PROT SERPL-MCNC: 7.8 G/DL (ref 6–8.5)
PROT UR QL STRIP: NEGATIVE
RBC # BLD AUTO: 4.31 10*6/MM3 (ref 3.77–5.28)
RBC # UR STRIP: ABNORMAL /HPF
REF LAB TEST METHOD: ABNORMAL
SODIUM SERPL-SCNC: 142 MMOL/L (ref 136–145)
SP GR UR STRIP: 1.02 (ref 1–1.03)
SQUAMOUS #/AREA URNS HPF: ABNORMAL /HPF
TRANS CELLS #/AREA URNS HPF: ABNORMAL /HPF
TRIGL SERPL-MCNC: 124 MG/DL (ref 0–150)
TSH SERPL DL<=0.05 MIU/L-ACNC: 2.52 UIU/ML (ref 0.27–4.2)
UROBILINOGEN UR QL STRIP: ABNORMAL
VIT B12 BLD-MCNC: 870 PG/ML (ref 211–946)
VLDLC SERPL-MCNC: 22 MG/DL (ref 5–40)
WBC # UR STRIP: ABNORMAL /HPF
WBC NRBC COR # BLD: 4.73 10*3/MM3 (ref 3.4–10.8)
YEAST URNS QL MICRO: ABNORMAL /HPF

## 2023-10-02 PROCEDURE — 82043 UR ALBUMIN QUANTITATIVE: CPT

## 2023-10-02 PROCEDURE — 80061 LIPID PANEL: CPT

## 2023-10-02 PROCEDURE — 84443 ASSAY THYROID STIM HORMONE: CPT

## 2023-10-02 PROCEDURE — 81001 URINALYSIS AUTO W/SCOPE: CPT

## 2023-10-02 PROCEDURE — 80053 COMPREHEN METABOLIC PANEL: CPT

## 2023-10-02 PROCEDURE — 82570 ASSAY OF URINE CREATININE: CPT

## 2023-10-02 PROCEDURE — 82607 VITAMIN B-12: CPT

## 2023-10-02 PROCEDURE — 85025 COMPLETE CBC W/AUTO DIFF WBC: CPT

## 2023-10-02 PROCEDURE — 82306 VITAMIN D 25 HYDROXY: CPT

## 2023-10-03 ENCOUNTER — TELEPHONE (OUTPATIENT)
Dept: INTERNAL MEDICINE | Facility: CLINIC | Age: 63
End: 2023-10-03

## 2023-10-03 RX ORDER — ACETAMINOPHEN 160 MG
2000 TABLET,DISINTEGRATING ORAL DAILY
Qty: 30 CAPSULE | Refills: 11
Start: 2023-10-03 | End: 2023-10-05

## 2023-10-03 RX ORDER — ATORVASTATIN CALCIUM 40 MG/1
40 TABLET, FILM COATED ORAL NIGHTLY
Qty: 90 TABLET | Refills: 1 | Status: SHIPPED | OUTPATIENT
Start: 2023-10-03

## 2023-10-03 NOTE — TELEPHONE ENCOUNTER
Hub staff attempted to follow warm transfer process and was unsuccessful     Caller: Park Galvez    Relationship to patient: Self    Best call back number: [phone]    Patient is needing: PATIENT IS TRYING TO RETURN A CALL TO [facility]

## 2023-10-04 ENCOUNTER — PATIENT ROUNDING (BHMG ONLY) (OUTPATIENT)
Dept: INTERNAL MEDICINE | Facility: CLINIC | Age: 63
End: 2023-10-04
Payer: MEDICARE

## 2023-10-04 NOTE — TELEPHONE ENCOUNTER
I called patient advised per lab results she had some questions that I sent to Dr. López on result note message.

## 2023-10-05 RX ORDER — MELATONIN
1000 DAILY
Qty: 60 TABLET | Refills: 5
Start: 2023-10-05

## 2023-11-03 ENCOUNTER — TELEPHONE (OUTPATIENT)
Dept: INTERNAL MEDICINE | Facility: CLINIC | Age: 63
End: 2023-11-03

## 2023-11-03 ENCOUNTER — OFFICE VISIT (OUTPATIENT)
Dept: INTERNAL MEDICINE | Facility: CLINIC | Age: 63
End: 2023-11-03
Payer: MEDICARE

## 2023-11-03 VITALS
HEIGHT: 63 IN | DIASTOLIC BLOOD PRESSURE: 72 MMHG | TEMPERATURE: 97.3 F | OXYGEN SATURATION: 97 % | WEIGHT: 122.8 LBS | BODY MASS INDEX: 21.76 KG/M2 | SYSTOLIC BLOOD PRESSURE: 122 MMHG | HEART RATE: 79 BPM

## 2023-11-03 DIAGNOSIS — M62.89 PELVIC FLOOR DYSFUNCTION: Chronic | ICD-10-CM

## 2023-11-03 DIAGNOSIS — G60.9 IDIOPATHIC PERIPHERAL NEUROPATHY: Chronic | ICD-10-CM

## 2023-11-03 DIAGNOSIS — M53.3 COCCYDYNIA: Primary | Chronic | ICD-10-CM

## 2023-11-03 DIAGNOSIS — M81.0 AGE-RELATED OSTEOPOROSIS WITHOUT CURRENT PATHOLOGICAL FRACTURE: Chronic | ICD-10-CM

## 2023-11-03 PROCEDURE — 1159F MED LIST DOCD IN RCRD: CPT | Performed by: INTERNAL MEDICINE

## 2023-11-03 PROCEDURE — 1160F RVW MEDS BY RX/DR IN RCRD: CPT | Performed by: INTERNAL MEDICINE

## 2023-11-03 PROCEDURE — 99214 OFFICE O/P EST MOD 30 MIN: CPT | Performed by: INTERNAL MEDICINE

## 2023-11-03 RX ORDER — MELATONIN
1000 DAILY
Start: 2023-11-03

## 2023-11-03 RX ORDER — DULOXETIN HYDROCHLORIDE 20 MG/1
20 CAPSULE, DELAYED RELEASE ORAL DAILY
Qty: 30 CAPSULE | Refills: 5 | Status: SHIPPED | OUTPATIENT
Start: 2023-11-03

## 2023-11-03 NOTE — ASSESSMENT & PLAN NOTE
She will continue physical therapy and exercises with Luis Kaba.  She will continue seeing Dr. Mesha Martinez. He is going to try an epidural steroid injection next. I am also referring her to neurosurgery, Dr. Maldonado, for his opinion. We also discussed possibly adding Cymbalta (duloxetine) daily. We could start with a low dose of 20 or 30 mg per day. It is a little bit different from the SSRI she tried in the past, so she might tolerate the Cymbalta. She will continue taking gabapentin 3 times per day. She may also try turmeric and curcumin which do decrease inflammation.

## 2023-11-03 NOTE — TELEPHONE ENCOUNTER
Caller: Park Galvez    Relationship: Self    Best call back number: [phone]     What medication are you requesting: CYMBALTA     What are your current symptoms: NERVE PAIN     If a prescription is needed, what is your preferred pharmacy and phone number: [id number] - [place], [place] - [address]  AT [address] & [address] [facility] - [phone] [address] [phone] FX     Additional notes: PATIENT STATES AT HER APPOINTMENT TODAY DR. STILES WANTED TO PUT HER ON THIS MEDICATION BUT THE PATIENT THOUGHT SHE COULD WAIT IT OUT BUT NOW SHE WOULD LIKE TO KNOW IF SHE CAN BE PRESCRIBED IT.     SHE WOULD LIKE THE LOWEST POSSIBLE DOSE.

## 2023-11-03 NOTE — ASSESSMENT & PLAN NOTE
She will continue her exercise classes which are incorporating some exercises for the pelvic floor as well. Yoga, relaxation, and meditation are also recommended. Cymbalta could definitely help with pelvic floor tightness as well.

## 2023-11-03 NOTE — ASSESSMENT & PLAN NOTE
I have advised her to show the EMG/NCV report to Dr. Martinez and also to Dr. Maldonado when she sees him.

## 2023-11-03 NOTE — PROGRESS NOTES
[facility]     Park Galvez  1960   [id number]      Patient Care Team:  Alta López MD as PCP - General (Internal Medicine)  Luis Cuello MD as Consulting Physician (Ophthalmology)  Mesha Martinez MD as Consulting Physician (Pain Medicine)    Chief Complaint   Patient presents with    Coccydynia            HPI  Patient is a 63 y.o. female who presents today for a follow-up.    Hyperlipidemia  The patient is currently taking increased Lipitor. She has been on Lipitor for 13 years. Her labs have already been discussed. She thinks it is odd for her cholesterol to increase after being on Lipitor for so long.      Back pain  The patient had an MRI of the pelvis. She is seeing Dr. Martinez at [facility]. The pain is worse when she is sitting. It feels like it is her tailbone and it radiates forward. Two years ago she started out with some burning pain more towards the front. She was having urinary urgency. The patient went through a hysterectomy, went through hormone patches, and switched to a cream. After she started the vaginal cream twice per week, and she does not have the urgency anymore. She feels like she has some genital burning, but it seems like it starts in the back. Dr. Martinez did different injections to the ligaments in her lower buttocks, and it seems to help some. Sometimes it comes back worse depending on where she is sitting. It seems like it is around her tailbone. Dr. Martinez did an MRI because the last one she could not see the tailbone very well. 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 There was also some fluid along the sacrum, presumably arthritic in nature. There is some grade 1 slippage of L4 over L5 forward.  He offered to do an epidural to see if that helps, which is scheduled for 11/07/2023. She is encouraged to go for any treatment he offers. The patient does not have physical therapy  anymore, but they do have exercise classes that are available through her Medicare. It is supervised by the physical therapist at [facility]. She asked if she should see a neurosurgeon. The patient is encouraged to stay with Dr. Martinez. She states he tends to use PRP or prolotherapy, but advised he did not know if PRP would help her.     She has been using heat packs at times, and her skin feels weird. It is a classic heat pack pattern on her low back. She has been using cold packs towards her tailbone. When she was having the pelvic floor therapy, they felt like she had a tight pelvic floor. She did yoga for a while. The patient was swimming in the summer, but she has not done it as much recently. When she sits, it feels like it is around her tailbone. The harder surface was worse, but now it seems like the cushion that pushes up is worse. She did try a cut-out pillow, but she felt like it was pushing everything in the middle. The patient is taking gabapentin 300 mg 3 times per day. It is not as helpful as it was in the fall. The doctor had ordered up to 400 mg, but she felt like it was too much. She felt like her heart was racing and felt too loopy. She will ask Dr. Martinez about adding Cymbalta. The patient usually takes Tylenol and is unsure if it helps. She still wakes up shaky in the morning. The patient takes magnesium at night. She is not sure if it is just her cortisol that is out of whack. The patient just wakes up feeling it. Her blood pressure is good. Sometimes she feels like there is tingling in her legs. She had an EMG done in 09/2022 or 10/2022 with Dr. Heriberto Billingsley in his office. The patient was told she has some neuropathy. When she sits, she feels like she stands up, her feet feel more numb after sitting. She feels like things have not really changed. The patient had been treated for pudendal nerve, but Dr. Martinez did not feel that was the problem. She will take the results of her  EMG to show Dr. Martinez at her next visit. The patient had an MRI of the lumbar spine in 09/2022. The findings were pretty much the same thing as the pelvis MRI she just had, but there was no mention of the Tarlov cysts on the other MRI of the spine. She is agreeable to a referral to Dr. Maldonado, neurosurgery. The patient will bring the MRI CD from [facility] with her for him to see. She asked if there is a benefit from taking turmeric or curcumin. The patient is counseled she may try them. Other people state they have more luck with Osteo Bi-Flex or glucosamine chondroitin.     Vitamin D deficiency  The patient is taking calcium and vitamin D supplement.    Depression  The patient has tried a couple of SSRIs in the past, but it made her have insomnia and felt like her heart was racing. She is counseled the SSRI also help improve pain. The patient is hesitant with that and will think about taking a low dose. She was tried on Lexapro and BuSpar, and they all made her feel like her heart was racing, and she had insomnia. The patient wakes up to urinate and then starts having pain. She is unsure if it irritates the nerve endings or what. The patient usually sleeps about 5 hours.    Health maintenance  The patient has received her influenza vaccine and pneumonia vaccine.   She has not received her COVID-19 vaccine yet and will wait for a while.      CHRONIC CONDITIONS      Past Medical History:   Diagnosis Date    Allergic     Macrobid    Cataract     Removal right eye    Hyperlipidemia     Taking Lipitor    Neuromuscular disorder     Pelvic floor trt    Osteopenia     Taking Fosamax 3 yrs       Past Surgical History:   Procedure Laterality Date    COLONOSCOPY  Jan 2023    Nml    CORNEAL TRANSPLANT Right 2017    PARTIAL REPLACEMENT    HERNIA REPAIR      HYSTERECTOMY  03/2022    and BSO    INGUINAL HERNIA REPAIR      Sx tepair    UMBILICAL HERNIA REPAIR      Sx repair       Family History   Problem Relation  "Age of Onset    Miscarriages / Stillbirths Mother     Dementia Mother     Heart disease Father     Hyperlipidemia Father     Vision loss Father         Glaucoma    Hypertension Sister     Heart failure Sister     Sleep apnea Sister     Stroke Brother     Stroke Maternal Grandfather     Lung disease Maternal Grandfather     Obesity Paternal Grandmother     Pulmonary embolism Paternal Grandmother     Breast cancer Neg Hx     Ovarian cancer Neg Hx        Social History     Socioeconomic History    Marital status:    Tobacco Use    Smoking status: Never    Smokeless tobacco: Never   Vaping Use    Vaping Use: Never used   Substance and Sexual Activity    Alcohol use: Yes     Alcohol/week: 10.0 standard drinks of alcohol     Types: 10 Glasses of wine per week     Comment: Occas    Drug use: Never    Sexual activity: Not Currently     Partners: Male       Allergies   Allergen Reactions    Macrobid [Nitrofurantoin] Hives    Amitriptyline Other (See Comments)     Shaky and insomnia. Sleep distrubution.    Shaky and insomnia. Sleep distrubution.     Shaky and insomnia. Sleep distrubution.    Buspirone Palpitations    Lexapro [Escitalopram] Palpitations    Oxycodone-Acetaminophen Other (See Comments)     Other reaction(s): Headache   Pt stated more nausea  feeling than headache and had to get medication for the nausea       Vital Signs  Vitals:    11/03/23 0840   BP: 122/72   BP Location: Left arm   Patient Position: Sitting   Cuff Size: Adult   Pulse: 79   Temp: 97.3 °F (36.3 °C)   TempSrc: Infrared   SpO2: 97%   Weight: 55.7 kg (122 lb 12.8 oz)   Height: 160.9 cm (63.35\")   PainSc:   7   PainLoc: Back     Body mass index is 21.52 kg/m².  BMI is within normal parameters. No other follow-up for BMI required.        Current Outpatient Medications:     alendronate (FOSAMAX) 70 MG tablet, Take 1 tablet by mouth Every 7 (Seven) Days., Disp: , Rfl:     atorvastatin (LIPITOR) 40 MG tablet, Take 1 tablet by mouth Every " Night., Disp: 90 tablet, Rfl: 1    Calcium Carb-Cholecalciferol (CALCIUM 500 + D PO), Take  by mouth Daily., Disp: , Rfl:     cholecalciferol (Vitamin D, Cholecalciferol,) 25 MCG (1000 UT) tablet, Take 1 tablet by mouth Daily., Disp: , Rfl:     estradiol (ESTRACE) 0.1 MG/GM vaginal cream, Insert 2 g into the vagina 2 (Two) Times a Week., Disp: , Rfl:     fluorometholone (FML) 0.1 % ophthalmic suspension, Administer 1 drop to the right eye Daily., Disp: , Rfl:     gabapentin (NEURONTIN) 300 MG capsule, Take 1 capsule by mouth 3 (Three) Times a Day., Disp: 90 capsule, Rfl: 2    Magnesium 250 MG tablet, Take 1 tablet by mouth Daily., Disp: , Rfl:     multivitamin (THERAGRAN) tablet tablet, Take 1 tablet by mouth Daily., Disp: , Rfl:     Omega-3 Fatty Acids (fish oil) 1000 MG capsule capsule, Take 2 capsules by mouth Daily., Disp: , Rfl:     DULoxetine (CYMBALTA) 20 MG capsule, Take 1 capsule by mouth Daily., Disp: 30 capsule, Rfl: 5    Physical Exam:    Physical Exam  Vitals and nursing note reviewed.   Constitutional:       Appearance: She is well-developed.   HENT:      Head: Normocephalic.   Eyes:      Conjunctiva/sclera: Conjunctivae normal.      Pupils: Pupils are equal, round, and reactive to light.   Neck:      Thyroid: No thyromegaly.   Cardiovascular:      Rate and Rhythm: Normal rate and regular rhythm.      Heart sounds: Normal heart sounds.   Pulmonary:      Effort: Pulmonary effort is normal.      Breath sounds: Normal breath sounds.   Musculoskeletal:         General: Normal range of motion.      Cervical back: Normal range of motion and neck supple.   Lymphadenopathy:      Cervical: No cervical adenopathy.   Neurological:      Mental Status: She is alert and oriented to person, place, and time.   Psychiatric:         Thought Content: Thought content normal.          ACE III MINI        Results Review:    I reviewed the patient's new clinical results.    CMP:  Lab Results   Component Value Date    BUN 16  "10/02/2023    CREATININE 0.91 10/02/2023    BCR 17.6 10/02/2023     10/02/2023    K 3.8 10/02/2023    CO2 30.2 (H) 10/02/2023    CALCIUM 9.9 10/02/2023    ALBUMIN 5.0 10/02/2023    BILITOT 0.4 10/02/2023    ALKPHOS 75 10/02/2023    AST 30 10/02/2023    ALT 21 10/02/2023     HbA1c:  No results found for: \"HGBA1C\"  Microalbumin:  Lab Results   Component Value Date    MICROALBUR 1.4 10/02/2023     Lipid Panel  Lab Results   Component Value Date    CHOL 215 (H) 10/02/2023    TRIG 124 10/02/2023    HDL 62 (H) 10/02/2023     (H) 10/02/2023    AST 30 10/02/2023    ALT 21 10/02/2023       Medication Review: Medications reviewed and noted  Patient Instructions   Problem List Items Addressed This Visit          Genitourinary and Reproductive     Pelvic floor dysfunction (Chronic)    Current Assessment & Plan     She will continue her exercise classes which are incorporating some exercises for the pelvic floor as well. Yoga, relaxation, and meditation are also recommended. Cymbalta could definitely help with pelvic floor tightness as well.            Musculoskeletal and Injuries    Age-related osteoporosis without current pathological fracture (Chronic)    Overview     7/2022 DEXA showed some improvement with lowest T score in the femoral neck at -2.9.  Lowest T score in the lumbar spine is -2.7.    Taking alendronate since 2020.          Current Assessment & Plan     She will continue weightbearing exercise and continue alendronate, calcium, and vitamin D3.            Neuro    Coccydynia - Primary (Chronic)    Overview     Patient is doing physical therapy and exercise classes with Luis olsen [facility].         Current Assessment & Plan     She will continue physical therapy and exercises with Luis olsen [facility].  She will continue seeing Dr. Mesha Martinez. He is going to try an epidural steroid injection next. I am also referring her to neurosurgery, Dr. Maldonado, for his opinion. We also discussed possibly " adding Cymbalta (duloxetine) daily. We could start with a low dose of 20 or 30 mg per day. It is a little bit different from the SSRI she tried in the past, so she might tolerate the Cymbalta. She will continue taking gabapentin 3 times per day. She may also try turmeric and curcumin which do decrease inflammation.         Relevant Medications    gabapentin (NEURONTIN) 300 MG capsule    Other Relevant Orders    Ambulatory Referral to Neurosurgery    Idiopathic peripheral neuropathy (Chronic)    Overview     Mild numbness and tingling in the feet in the mornings.  She had an EMG/NCV  by Dr. Billingsley which did show mild peripheral neuropathy.         Current Assessment & Plan     I have advised her to show the EMG/NCV report to Dr. Martinez and also to Dr. Maldonado when she sees him.               Diagnosis Plan   1. Coccydynia  Ambulatory Referral to Neurosurgery      2. Idiopathic peripheral neuropathy        3. Pelvic floor dysfunction        4. Age-related osteoporosis without current pathological fracture          Follow-up: She will come back to see me on 12/18/2023.        Plan of care reviewed with patient at the conclusion of today's visit. Education was provided regarding diagnosis, management, and any prescribed or recommended OTC medications.Patient verbalizes understanding of and agreement with management plan.         Alta López MD      Transcribed from ambient dictation for Alta López MD by Marlena Cano.  11/03/23   10:51 EDT    Patient or patient representative verbalized consent to the visit recording.  I have personally performed the services described in this document as transcribed by the above individual, and it is both accurate and complete.

## 2023-11-06 NOTE — PATIENT INSTRUCTIONS
Patient Instructions  Problem List Items Addressed This Visit          Genitourinary and Reproductive     Pelvic floor dysfunction (Chronic)    Current Assessment & Plan     She will continue her exercise classes which are incorporating some exercises for the pelvic floor as well. Yoga, relaxation, and meditation are also recommended. Cymbalta could definitely help with pelvic floor tightness as well.            Musculoskeletal and Injuries    Age-related osteoporosis without current pathological fracture (Chronic)    Overview     7/2022 DEXA showed some improvement with lowest T score in the femoral neck at -2.9.  Lowest T score in the lumbar spine is -2.7.    Taking alendronate since 2020.          Current Assessment & Plan     She will continue weightbearing exercise and continue alendronate, calcium, and vitamin D3.            Neuro    Coccydynia - Primary (Chronic)    Overview     Patient is doing physical therapy and exercise classes with Luis at [facility].         Current Assessment & Plan     She will continue physical therapy and exercises with Luis olsen [facility].  She will continue seeing Dr. Mesha Martinez. He is going to try an epidural steroid injection next. I am also referring her to neurosurgery, Dr. Maldonado, for his opinion. We also discussed possibly adding Cymbalta (duloxetine) daily. We could start with a low dose of 20 or 30 mg per day. It is a little bit different from the SSRI she tried in the past, so she might tolerate the Cymbalta. She will continue taking gabapentin 3 times per day. She may also try turmeric and curcumin which do decrease inflammation.         Relevant Medications    gabapentin (NEURONTIN) 300 MG capsule    Other Relevant Orders    Ambulatory Referral to Neurosurgery    Idiopathic peripheral neuropathy (Chronic)    Overview     Mild numbness and tingling in the feet in the mornings.  She had an EMG/NCV  by Dr. Billingsley which did show mild peripheral neuropathy.          Current Assessment & Plan     I have advised her to show the EMG/NCV report to Dr. Martinez and also to Dr. Maldonado when she sees him.

## 2023-11-08 ENCOUNTER — TELEPHONE (OUTPATIENT)
Dept: INTERNAL MEDICINE | Facility: CLINIC | Age: 63
End: 2023-11-08
Payer: MEDICARE

## 2023-11-08 DIAGNOSIS — M53.3 COCCYDYNIA: Chronic | ICD-10-CM

## 2023-11-08 DIAGNOSIS — R20.0 NUMBNESS AND TINGLING OF BOTH LEGS: Primary | Chronic | ICD-10-CM

## 2023-11-08 DIAGNOSIS — G60.9 IDIOPATHIC PERIPHERAL NEUROPATHY: Chronic | ICD-10-CM

## 2023-11-08 DIAGNOSIS — R20.2 NUMBNESS AND TINGLING OF BOTH LEGS: Primary | Chronic | ICD-10-CM

## 2023-11-08 NOTE — TELEPHONE ENCOUNTER
PATIENT CALLED REQUESTING TO SPEAK TO EAN AGAIN AS SHE FORGOT TO MENTION SOME ADDITIONAL CONCERNS. PLEASE ADVISE [phone]

## 2023-11-08 NOTE — TELEPHONE ENCOUNTER
I called Dr. Maldonado's office and was advised to have both of the dx codes added to the referral.

## 2023-11-08 NOTE — TELEPHONE ENCOUNTER
Dr. Maldonado's office called and said they do not see for the dx on the referral. If the pt is having numbness/tingling in the legs then neurology will see for dx code, if the pt is having pain then pain management will see for this dx.

## 2023-11-08 NOTE — TELEPHONE ENCOUNTER
The pt called back and was not sure if she mentioned in previous visits that she does not have normal sensations when she needs to urinate or have a bowel movement. She will typically have burning or feel pressure, this started around the time she was diagnosed with mild peripheral neuropathy. I let her know Dr. López submitted a new referral to Dr. Maldonado.

## 2023-11-15 RX ORDER — ALENDRONATE SODIUM 70 MG/1
TABLET ORAL
Qty: 4 TABLET | Refills: 5 | Status: SHIPPED | OUTPATIENT
Start: 2023-11-15

## 2023-11-20 ENCOUNTER — PATIENT MESSAGE (OUTPATIENT)
Dept: INTERNAL MEDICINE | Facility: CLINIC | Age: 63
End: 2023-11-20
Payer: MEDICARE

## 2023-11-21 NOTE — TELEPHONE ENCOUNTER
From: Park Galvez  To: Alta López  Sent: 11/20/2023 10:27 AM EST  Subject: Referral    Dr. López I have an appt with neurosx in [place]. I feel the numbness and tingling has worsened in my legs and feet with sitting and standing. Could I have a referral with neurologist? The caudal epidural did not relieve symptoms.  Thank you

## 2023-11-27 ENCOUNTER — TELEPHONE (OUTPATIENT)
Dept: INTERNAL MEDICINE | Facility: CLINIC | Age: 63
End: 2023-11-27
Payer: MEDICARE

## 2023-11-27 DIAGNOSIS — G60.9 IDIOPATHIC PERIPHERAL NEUROPATHY: Primary | Chronic | ICD-10-CM

## 2023-11-27 NOTE — TELEPHONE ENCOUNTER
PT NEEDS A NEW REFERRAL FOR DR SUMANTH PRECIADO PLEASE.  FOR NUMBNESS AND TINGLING FROM HER FEET TO HER KNEES BILATERAL.

## 2023-12-21 ENCOUNTER — TRANSCRIBE ORDERS (OUTPATIENT)
Dept: ADMINISTRATIVE | Facility: HOSPITAL | Age: 63
End: 2023-12-21
Payer: MEDICARE

## 2023-12-21 DIAGNOSIS — K62.89 ANAL PAIN: Primary | ICD-10-CM

## 2024-02-08 RX ORDER — ALENDRONATE SODIUM 70 MG/1
TABLET ORAL
Qty: 4 TABLET | Refills: 5 | Status: SHIPPED | OUTPATIENT
Start: 2024-02-08

## 2024-03-13 ENCOUNTER — HOSPITAL ENCOUNTER (OUTPATIENT)
Dept: GENERAL RADIOLOGY | Facility: HOSPITAL | Age: 64
Discharge: HOME | End: 2024-03-13
Admitting: PHYSICIAN ASSISTANT
Payer: MEDICARE

## 2024-03-13 ENCOUNTER — OFFICE VISIT (OUTPATIENT)
Dept: NEUROSURGERY | Facility: CLINIC | Age: 64
End: 2024-03-13
Payer: MEDICARE

## 2024-03-13 VITALS — WEIGHT: 125 LBS | BODY MASS INDEX: 22.15 KG/M2 | HEIGHT: 63 IN | TEMPERATURE: 97.5 F

## 2024-03-13 DIAGNOSIS — M43.10 SPONDYLOLISTHESIS, ACQUIRED: Primary | ICD-10-CM

## 2024-03-13 DIAGNOSIS — M43.10 SPONDYLOLISTHESIS, ACQUIRED: ICD-10-CM

## 2024-03-13 DIAGNOSIS — M53.3 SACRAL BACK PAIN: ICD-10-CM

## 2024-03-13 DIAGNOSIS — M47.816 FACET ARTHRITIS OF LUMBAR REGION: ICD-10-CM

## 2024-03-13 DIAGNOSIS — M51.36 DISC DEGENERATION, LUMBAR: ICD-10-CM

## 2024-03-13 PROCEDURE — 72120 X-RAY BEND ONLY L-S SPINE: CPT

## 2024-03-13 NOTE — PROGRESS NOTES
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  She presents with complaints of lower back and sacral pain that began almost 2 years ago after a fall.  Symptoms are worse with prolonged sitting and lying down.  Lying for her MRI yesterday was particularly painful.  She describes a burning sensation that occurs in the lower back.  She also notices this with prolonged standing.  Typically she can walk around and symptoms will diminish.  She has been treated with pelvic floor therapy and PRP therapy.  She had a pudendal block.  Nothing has provided lasting relief.  She also has peripheral neuropathy diagnosed by electrodiagnostic studies.  She describes burning numbness and tingling in the feet and lower legs.    Past Medical History:   Diagnosis Date    Allergic     Macrobid    Cataract     Removal right eye    Hyperlipidemia     Taking Lipitor    Neuromuscular disorder     Pelvic floor trt    Osteopenia     Taking Fosamax 3 yrs         Current Outpatient Medications:     alendronate (FOSAMAX) 70 MG tablet, TAKE 1 TABLET BY MOUTH WEEKLY IN THE MORNING 30 MINUTES BEFORE FOOD, DRINK OR OTHER MEDICATIONS, Disp: 4 tablet, Rfl: 5    Calcium Carb-Cholecalciferol (CALCIUM 500 + D PO), Take  by mouth Daily., Disp: , Rfl:     cholecalciferol (Vitamin D, Cholecalciferol,) 25 MCG (1000 UT) tablet, Take 1 tablet by mouth Daily., Disp: , Rfl:     estradiol (ESTRACE) 0.1 MG/GM vaginal cream, Insert 2 g into the vagina 2 (Two) Times a Week., Disp: , Rfl:     fluorometholone (FML) 0.1 % ophthalmic suspension, Administer 1 drop to the right eye Daily., Disp: , Rfl:     gabapentin (NEURONTIN) 300 MG capsule, Take 1 capsule by mouth 3 (Three) Times a Day., Disp: 90 capsule, Rfl: 2    multivitamin (THERAGRAN) tablet tablet, Take 1 tablet by mouth  Daily., Disp: , Rfl:     Omega-3 Fatty Acids (fish oil) 1000 MG capsule capsule, Take 2 capsules by mouth Daily., Disp: , Rfl:     atorvastatin (LIPITOR) 40 MG tablet, Take 1 tablet by mouth Every Night., Disp: 90 tablet, Rfl: 1    DULoxetine (CYMBALTA) 20 MG capsule, Take 1 capsule by mouth Daily., Disp: 30 capsule, Rfl: 5    Magnesium 250 MG tablet, Take 1 tablet by mouth Daily., Disp: , Rfl:     Past Surgical History:   Procedure Laterality Date    COLONOSCOPY  Jan 2023    Nml    CORNEAL TRANSPLANT Right 2017    PARTIAL REPLACEMENT    HERNIA REPAIR      HYSTERECTOMY  03/2022    and BSO    INGUINAL HERNIA REPAIR      Sx tepair    UMBILICAL HERNIA REPAIR      Sx repair       Social History     Socioeconomic History    Marital status:    Tobacco Use    Smoking status: Never    Smokeless tobacco: Never   Vaping Use    Vaping status: Never Used   Substance and Sexual Activity    Alcohol use: Yes     Alcohol/week: 10.0 standard drinks of alcohol     Types: 10 Glasses of wine per week     Comment: Occas    Drug use: Never    Sexual activity: Not Currently     Partners: Male         Review of Systems   Constitutional:  Positive for activity change. Negative for appetite change, chills, diaphoresis, fatigue, fever and unexpected weight change.   HENT:  Negative for congestion, dental problem, drooling, ear discharge, ear pain, facial swelling, hearing loss, mouth sores, nosebleeds, postnasal drip, rhinorrhea, sinus pressure, sinus pain, sneezing, sore throat, tinnitus, trouble swallowing and voice change.    Eyes:  Negative for photophobia, pain, discharge, redness, itching and visual disturbance.   Respiratory:  Negative for apnea, cough, choking, chest tightness, shortness of breath, wheezing and stridor.    Cardiovascular:  Negative for chest pain, palpitations and leg swelling.   Gastrointestinal:  Positive for constipation and rectal pain. Negative for abdominal distention, abdominal pain, anal bleeding,  "blood in stool, diarrhea, nausea and vomiting.   Endocrine: Negative for cold intolerance, heat intolerance, polydipsia, polyphagia and polyuria.   Genitourinary:  Positive for pelvic pain. Negative for decreased urine volume, difficulty urinating, dysuria, enuresis, flank pain, frequency, genital sores, hematuria and urgency.   Musculoskeletal:  Positive for back pain, myalgias and neck pain. Negative for arthralgias, gait problem, joint swelling and neck stiffness.   Skin:  Negative for color change, pallor, rash and wound.   Allergic/Immunologic: Negative for environmental allergies, food allergies and immunocompromised state.   Neurological:  Positive for numbness. Negative for dizziness, tremors, seizures, syncope, facial asymmetry, speech difficulty, weakness, light-headedness and headaches.   Hematological:  Negative for adenopathy. Does not bruise/bleed easily.   Psychiatric/Behavioral:  Positive for sleep disturbance. Negative for agitation, behavioral problems, confusion, decreased concentration, dysphoric mood, hallucinations, self-injury and suicidal ideas. The patient is not nervous/anxious and is not hyperactive.    All other systems reviewed and are negative.      Objective   Vital Signs: Temperature 97.5 °F (36.4 °C), temperature source Temporal, height 160 cm (63\"), weight 56.7 kg (125 lb).  Physical Exam  Vitals and nursing note reviewed.   Constitutional:       General: She is not in acute distress.     Appearance: She is well-developed.   HENT:      Head: Normocephalic and atraumatic.   Psychiatric:         Behavior: Behavior normal.         Thought Content: Thought content normal.     Musculoskeletal:     Strength is intact in upper and lower extremities to direct testing.     Station and gait are normal.     Straight leg raising is negative.   Neurologic:     Muscle tone is normal throughout.     Coordination is intact.     Deep tendon reflexes: 2+ and symmetrical.     Sensation is intact to " light touch throughout.     Patient is oriented to person, place, and time.         Independent review of radiographic imaging: MRI of the lumbar spine demonstrates multilevel degenerative changes.  There is anterior listhesis of L4 on 5 with bilateral joint effusions.  Moderate canal stenosis     Assessment & Plan   Diagnosis:  Anterior listhesis L4 on 5 with possible mobility  Sacral pain    Medical Decision Making: Patient may have some instability at L4-5 where there is already some moderate stenosis.  While these findings do not completely match up with her clinical picture, it may be playing a role in her overall pain.  I am going to refer her for flexion-extension plain films and see her back to review.  We will make further recommendations based on those studies.    Diagnoses and all orders for this visit:    1. Spondylolisthesis, acquired (Primary)  -     XR Spine Lumbar Flex & Ext; Future    2. Disc degeneration, lumbar    3. Facet arthritis of lumbar region    4. Sacral back pain    Other orders  -     MRI outside films; Future             Addendum: I reviewed flexion-extension plain films once patient completed them.  I also reviewed these with Dr. Maldonado.  There appears to be a few millimeters of movement at L4-5.  Despite this finding, given patients narrative, Dr. Maldonado does not advise neurosurgical intervention given the low likelihood that it will alleviate her coccydynia.  I called patient and explained this to her and she is very understanding.  She has requested referral to our pain management service to discuss further treatment options.           BMI is within normal parameters. No other follow-up for BMI required.         Argenis Kumar PA-C  Patient Care Team:  Felicita Leslie DO as PCP - General (Family Medicine)  Luis Cuello MD as Consulting Physician (Ophthalmology)  Mesha Martinez MD as Consulting Physician (Pain Medicine)

## 2024-03-15 ENCOUNTER — TELEPHONE (OUTPATIENT)
Dept: NEUROSURGERY | Facility: CLINIC | Age: 64
End: 2024-03-15
Payer: MEDICARE

## 2024-03-15 NOTE — TELEPHONE ENCOUNTER
I called patient today and reviewed x-ray findings with her on the phone.  I also reviewed studies with Dr. Maldonado.  At this time we do not feel like surgical intervention will provide the relief she is seeking.  I have referred her to pain management per her request.    She does not need to follow-up in our office.

## 2024-03-28 ENCOUNTER — TELEPHONE (OUTPATIENT)
Dept: NEUROSURGERY | Facility: OTHER | Age: 64
End: 2024-03-28
Payer: MEDICARE

## 2024-03-28 DIAGNOSIS — Z00.8 PRE-SURGICAL PSYCHOLOGICAL ASSESSMENT, ENCOUNTER FOR: Primary | ICD-10-CM

## 2024-03-28 NOTE — TELEPHONE ENCOUNTER
PATIENT CALLED IN AND STATED SHE SPOKE WITH DR HERRING'S OFFICE- AND THEY STATED THEY NEED A CONSULTATION WITH [facility]- BEFORE AN APPT CAN BE MADE -    ARE WE TO SEND A REFERRAL OVER?    PATIENT REQUESTED CB OR MESSAGE VIA "[employer]    cb- [phone] (home)     PLEASE ADVISE- THANK YOU!

## 2024-04-19 DIAGNOSIS — M53.3 COCCYDYNIA: Chronic | ICD-10-CM

## 2024-04-19 RX ORDER — GABAPENTIN 300 MG/1
300 CAPSULE ORAL 3 TIMES DAILY
Qty: 90 CAPSULE | Refills: 1 | Status: SHIPPED | OUTPATIENT
Start: 2024-04-19

## 2024-04-19 NOTE — TELEPHONE ENCOUNTER
Rx Refill Note  Requested Prescriptions     Pending Prescriptions Disp Refills    gabapentin (NEURONTIN) 300 MG capsule [Pharmacy Med Name: GABAPENTIN 300MG CAPSULES] 90 capsule      Sig: TAKE 1 CAPSULE BY MOUTH THREE TIMES DAILY      Last office visit with prescribing clinician: 11/3/2023   Last telemedicine visit with prescribing clinician: Visit date not found   Next office visit with prescribing clinician: Visit date not found                         Would you like a call back once the refill request has been completed: [] Yes [] No    If the office needs to give you a call back, can they leave a voicemail: [] Yes [] No    Dimple Garcia MA  04/19/24, 08:42 EDT

## 2024-05-17 NOTE — PROGRESS NOTES
"Chief Complaint: \"Pain in my sacrum\"        History of Present Illness:   Patient: Ms. Park Galvez, 64 y.o. female   Referring Physician: Argenis Kumar PA-C  Reason for Referral: Consultation for chronic intractable lower back and sacral pain.   Pain History: Patient reports a greater than 1 year history of chronic intractable lower back and sacral pain, which began after a fall. I have reviewed more than 50 pages of medical records from Dr. Martinez. Flexion-extension x-rays of the lumbar spine on 03/13/2024 revealed multilevel degenerative changes with significant facet hypertrophy of the lower lumbar spine.  Grade 1 anterolisthesis of L4 on L5 without evidence of instability.  MRI of the sacrum with and without contrast on March 12, 2024 revealed moderate arthritic changes of the sacroiliac joints with osteophyte formation. Evidence of a small Tarlov cysts in the sacrum.  No abnormal enhancing lesions after administration of contrast. MRI of the lumbar spine with and without contrast March 12, 2024.  Possible transitional lumbar anatomy with partial sacralization of L5.  Rightward rotatory scoliotic curvature of the lumbar spine. Grade 1 retrolisthesis of T12 on L1.  Grade 1 anterolisthesis of L4 on L5. There are perineural cyst formations around the exiting L5 nerve roots and sacral Tarlov cysts.  No abnormal enhancing lesions after administration of contrast.  At L4-L5: Disc osteophyte complex formation, advanced facet hypertrophy. Anterior listhesis of L4 on 5 with bilateral joint effusions. Ligamentum flavum hypertrophy.  Moderate canal, lateral recess, and NF stenosis. There is a periarticular cyst formation on the left.  EMG/NCV by Dr. Javed Billingsley on 10/6/2022: Mild peripheral neuropathy. Sacral nerve roots are normal. She also has peripheral neuropathy diagnosed by electrodiagnostic studies.  She describes burning numbness and tingling in the feet and lower legs. Park Galvez has " "failed to obtain pain relief with conservative measures for more than 12 months including oral analgesics, topical analgesics, ice, heat, physical therapy (ongoing, last visit last week), physical therapist directed home exercise program HEP (ongoing), chiropractic therapy (in the past), therapeutic massage, to name a few. She has been treated with pelvic floor therapy and PRP therapy. She has had a couple of pudendal nerve blocks without relief. Dr. Martinez performed several treatments with PRP, prolotherapy to the SI joints, etc; Dr. Thompson performed pudendal blocks, ganglion impar blocks w/o relief, GYN performed pudendal nerve block under CT w/o relief, and at [facility], she underwent RT Obturator internus TPI w/o relief. Park Galvez underwent neurosurgical consultation with Argenis Kumar PA-C and was found not to be a surgical candidate. Pain has progressed in intensity over the past years. Park Schilling Lenny underwent neurosurgical consultation with Argenis Kumar PA-C and was found not to be a surgical candidate. Park Galvez underwent psychological evaluation on 04/16/2024 with Gale Coe, PhD: \"Patient is considered appropriate for a scs or Intrathecal pain pump at this time.\"  Pain Description: Constant lower back/sacrum pain with intermittent exacerbation, described as aching, dull, sharp, throbbing, and burning sensation.   Radiation of Pain: The pain radiates into the gluteal region  Pain intensity today: 8/10   Average pain intensity last week: 8/10  Pain intensity ranges from: 5/10 to 10/10  Aggravating factors: Pain increases with bending forward, protracted sitting, standing for too long, walking. Patient denies neurogenic claudication. Patient does not use a cane or walker   Alleviating factors: Pain decreases with lying down on her side, changing positions  Associated Symptoms:   Patient reports intermittent numbness and tingling from her knees down to her feet secondary to " "peripheral polyneuropathy.  Patient denies any new bladder or bowel problems.   Patient denies difficulties with her balance or recent falls.   Pain interferes with general activities and affects patient's quality of life  Pain does not interfere with sleep  Muscle spasms: No  Stiffness: LB    Review of previous therapies and additional medical records:  Park Galvez has already failed the following measures, including:   Conservative Measures: Oral analgesics, topical analgesics, ice, heat, physical therapy (ongoing, last visit last week), physical therapist directed home exercise program HEP (ongoing), chiropractic therapy (in the past), therapeutic massage  Interventional Measures: Dr. Martinez (PRP, prolotherapy), Dr. Thompson (pudendal blocks, ganglion impar),  (RT Obturator internus TPI)  Surgical Measures: No history of previous cervical spine, lumbar spine or hip surgery   Park Galvez underwent neurosurgical consultation with Argenis Kumar PA-C and was found not to be a surgical candidate.  Park Galvez underwent psychological evaluation on 04/16/2024 with Gale Coe, PhD: \"Patient is considered appropriate for a scs or Intrathecal pain pump at this time.\"  Park Galvez presents with significant comorbidities including osteoporosis, HLP   In terms of current analgesics, Park Galvez takes:  gabapentin 300 mg TID, Tylenol 1000 mg TID. Patient also takes alendronate (Fosamax)  I have reviewed Vamsi Report consistent with medication reconciliation.  SOAPP/ORT: Low Risk     PHQ-2 Depression Screening  Little interest or pleasure in doing things? 0-->not at all   Feeling down, depressed, or hopeless? 0-->not at all   PHQ-2 Total Score 0     Pain Self-Efficacy Questionnaire (PSEQ)  ITEM 05-21 2024        I can enjoy things despite the pain. 3        I can do most of the household chores (tidying up, washing dishes, etc), despite the pain. 4        I can socialize with my friends " or family members as often as I used to do, despite the pain. 2        I can cope with my pain in most situations. 2        I can do some form of work, despite the pain (includes housework, paid, and unpaid work). 4        I can still do many of the things I enjoy doing, such as hobbies or leisure activity despite pain. 3        I can cope with my pain without medications. 2        I can accomplish most of my goals in life despite the pain. 3        I can live in a normal lifestyle, despite the pain. 3        I can gradually become more active, despite the pain. 4        TOTAL SCORE 30/60            Global Pain Scale 05-21 2024          Pain 17          Feelings 2          Clinical outcomes 11          Activities 4          GPS Total: 37            The [place] Back Pain Disability Scale   DATE 05-21 2024          Sleep through the night 2          Turn over in bed 1          Get out of bed 1          Make your bed 1          Put on socks (pantyhose) 2          Ride in a car 2          Sit in a chair for several hours 4          Stand up for 20-30 minutes 4          Climb one flight of stairs 3          Walk a few blocks (200-300 yards)  2          Walk several miles 2          Run one block (about 50 yards) 4          Take food out of the refrigerator 1          Reach up to high shelves 1          Move a chair 1          Pull or push heavy doors 3          Bend over to clean the bathtub 3          Throw a ball 1          Carry two bags of groceries 1          Lift and carry a heavy suitcase 4          Total score 43            [place] Claudication Score  All questions are in reference to an average for the past month 05-21 2024          PAIN FREQUENCY:   How often have you experienced pain in your back or buttock or pain that goes down your back, buttock, and/or legs?  Not at all  Less than once a week  At least once a week  Every day for at least a few minutes  Every day for most of the day  Every minute of the  day 5          PAIN SEVERITY:   How would you describe the worst pain you have had in your back, buttock, and/or legs?  0. None  1. Mild  2. Moderate  3. Severe  4. Very severe  5. Intolerable 3          BACK PAIN SEVERITY:   How would you describe the pain or discomfort in your back and/or buttocks?  0. None  1. Mild  2. Moderate  3. Severe  4. Very severe  5. Intolerable 5          LEG PAIN SEVERITY:   How would you describe the pain or discomfort in your legs or feet?  None  Mild  Moderate  Severe  Very severe  Intolerable 3          NERVE SYMPTOM SEVERITY:   How would you describe the numbness or tingling in your legs or feet?  None  Mild  Moderate  Severe  Very severe  Intolerable 3          LEG WEAKNESS:   How would you describe the strength in your legs, ankles, or feet?  None  Mild  Moderate  Severe  Very severe  Intolerable 0          BALANCE:   Which statement best describes your steadiness when standing or walking?  0. I have had no problems with my balance.  1. I sometimes feel off-balance but I am able to walk without any aid.  2. I often feel off-balance but I am able to walk with an aid.  3. I am unable to walk without an aid.  4. I have difficulty walking despite using an aid.  5. I cannot stand up. 0          WALKING DISTANCE:   When you went for a walk, how far were you able to walk before your back or leg started giving you trouble?  0. No limits or more than 2 miles   1. More than 1/4 mile but less than 2 miles  2. More than 100 yards but less than 1/4 mile  3. More than 50 feet but less than 100 yards  4. Less than 50 feet  5. Not at all 1          WALKING ABILITY:   Which statement best describes your walking ability?  0. There is no limit to my walking ability.  1. I can walk far enough to do everything I want to do.  2. I am able to walk comfortably from my home to the shops or my transport.  3. I am able to walk comfortably around the house.  4. I am able to walk only from the bedroom to the  bathroom or kitchen.  5. I am not able to walk at all. 1          WALKING SPEED:   Which statement best describes your walking?  0. I am able to walk at a normal speed.  1. I walk slowly but standing upright.  2. I walk slowly and bent forward.  3. I have to stop and stand still when I walk.  4. I have to stop and sit down when I walk.  5. I cannot walk at all. 0          Total Score: Total Score _____% = (___) x 100                                                              50  21              [place] Diagnostic Criteria for Pudendal Neuralgia by Pudendal Nerve Entrapment:    Patient did not meet the essential criteria  Pain in the territory of the pudendal nerve: from the anus to the penis or clitoris: Not met  Pain is predominantly experienced while sitting: Met  The pain does not wake the patient at night: Met  Pain with no objective sensory impairment: Met  Pain relieved by diagnostic pudendal nerve block: Not met    Review of Diagnostic Studies:  I have independently reviewed and interpreted the images with the patient and used the images and a tridimensional spine model to explain findings. I have also reviewed the reports.  Flexion-extension x-rays of the lumbar spine on 03/13/2024 revealed grade 1 anterolisthesis of L4 on L5 without evidence of instability.  Multilevel degenerative changes with significant facet hypertrophy of the lower lumbar spine.    MRI of the sacrum with and without contrast on March 12, 2024 revealed moderate arthritic changes of the sacroiliac joints with osteophyte formation..  Evidence of Tarlov cysts in the sacrum.  There are no abnormal enhancing lesions after administration of contrast.  MRI of the lumbar spine with and without contrast March 12, 2024.  There could be transitional lumbar anatomy with partial sacralization of L5.  There is rightward rotatory scoliotic curvature of the lumbar spine.  Grade 1 retrolisthesis of T12 on L1.  Grade 1 anterolisthesis of L4 on L5.   There are perineural cyst formations around the exiting L5 nerve roots and sacral Tarlov cysts.  There are no abnormal enhancing lesions after administration of contrast.  Axial imaging:  T12-L1: Disc osteophyte complex formation.  No significant canal or foraminal stenosis  L1-L2, L2-L3: Disc bulge, facet hypertrophy.  No significant canal or foraminal stenosis  L3-L4: Small disc osteophyte complex formation.  Facet hypertrophy.  No significant canal stenosis.  Mild foraminal stenosis  L4-L5: Disc osteophyte complex formation, advanced facet hypertrophy with fluid signal interposed between the articular facet surfaces.  Ligamentum flavum hypertrophy.  Moderate canal, lateral recess and foraminal stenosis.  Periarticular cyst formation on the left.  L5-S1: Small disc bulge.  No significant canal or foraminal stenosis  EMG/NCV by Dr. Javed Billingsley on 10/6/2022: Mild peripheral neuropathy.  Sacral nerve roots are normal. 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   Genitourinary:  Positive for pelvic pain.   Musculoskeletal:  Positive for arthralgias, back pain, myalgias and neck stiffness.   Neurological:  Positive for numbness.   All other systems reviewed and are negative.        Patient Active Problem List   Diagnosis    Coccydynia    Age-related osteoporosis without current pathological fracture    Hypercholesterolemia    Fuchs' corneal dystrophy of both eyes    Urinary urgency    Idiopathic peripheral neuropathy    Pelvic floor dysfunction    Numbness and tingling of both legs    Entrapment neuropathy    Lumbar stenosis with neurogenic claudication    Lumbar discogenic pain syndrome    Degeneration of lumbosacral intervertebral disc    Polyneuropathy       Past Medical History:   Diagnosis Date    Allergic     Macrobid    Cataract     Removal right eye    Cervical disc disorder     Chronic pain disorder     Degeneration of  lumbosacral intervertebral disc 5/21/2024    Hyperlipidemia     Taking Lipitor    Low back pain     Neck pain     Neuromuscular disorder     Pelvic floor trt    Osteoarthritis     Osteopenia     Taking Fosamax 3 yrs    Spinal stenosis          Past Surgical History:   Procedure Laterality Date    COLONOSCOPY  Jan 2023    Nml    CORNEAL TRANSPLANT Right 2017    PARTIAL REPLACEMENT    EPIDURAL BLOCK  Caudal Nov 2023    HERNIA REPAIR      HYSTERECTOMY  03/2022    and BSO    INGUINAL HERNIA REPAIR      Sx tepair    TRIGGER POINT INJECTION      UMBILICAL HERNIA REPAIR      Sx repair         Family History   Problem Relation Age of Onset    Miscarriages / Stillbirths Mother     Dementia Mother     Heart disease Father     Hyperlipidemia Father     Vision loss Father         Glaucoma    Hypertension Sister     Heart failure Sister     Sleep apnea Sister     Stroke Brother     Stroke Maternal Grandfather     Lung disease Maternal Grandfather     Obesity Paternal Grandmother     Pulmonary embolism Paternal Grandmother     Breast cancer Neg Hx     Ovarian cancer Neg Hx          Social History     Socioeconomic History    Marital status:    Tobacco Use    Smoking status: Never    Smokeless tobacco: Never   Vaping Use    Vaping status: Never Used   Substance and Sexual Activity    Alcohol use: Yes     Alcohol/week: 10.0 standard drinks of alcohol     Comment: Occas    Drug use: Never    Sexual activity: Not Currently     Partners: Male     Birth control/protection: Hysterectomy           Current Outpatient Medications:     alendronate (FOSAMAX) 70 MG tablet, TAKE 1 TABLET BY MOUTH WEEKLY IN THE MORNING 30 MINUTES BEFORE FOOD, DRINK OR OTHER MEDICATIONS, Disp: 4 tablet, Rfl: 5    atorvastatin (Lipitor) 20 MG tablet, , Disp: , Rfl:     Calcium Carb-Cholecalciferol (CALCIUM 500 + D PO), Take  by mouth Daily., Disp: , Rfl:     cholecalciferol (Vitamin D, Cholecalciferol,) 25 MCG (1000 UT) tablet, Take 1 tablet by mouth  "Daily., Disp: , Rfl:     estradiol (ESTRACE) 0.1 MG/GM vaginal cream, Insert 2 g into the vagina 2 (Two) Times a Week., Disp: , Rfl:     fluorometholone (FML) 0.1 % ophthalmic suspension, Administer 1 drop to the right eye Daily., Disp: , Rfl:     gabapentin (NEURONTIN) 300 MG capsule, TAKE 1 CAPSULE BY MOUTH THREE TIMES DAILY, Disp: 90 capsule, Rfl: 1    multivitamin (THERAGRAN) tablet tablet, Take 1 tablet by mouth Daily., Disp: , Rfl:     Omega-3 Fatty Acids (fish oil) 1000 MG capsule capsule, Take 2 capsules by mouth Daily., Disp: , Rfl:       Allergies   Allergen Reactions    Macrobid [Nitrofurantoin] Hives    Amitriptyline Other (See Comments)     Shaky and insomnia. Sleep distrubution.    Shaky and insomnia. Sleep distrubution.     Shaky and insomnia. Sleep distrubution.    Shaky and insomnia. Sleep distrubution.      Shaky and insomnia. Sleep distrubution.  Shaky and insomnia. Sleep distrubution.   Shaky and insomnia. Sleep distrubution.      Shaky and insomnia. Sleep distrubution.  Shaky and insomnia. Sleep distrubution.    Buspirone Palpitations    Lexapro [Escitalopram] Palpitations    Oxycodone-Acetaminophen Other (See Comments) and Headache     Other reaction(s): Headache   Pt stated more nausea  feeling than headache and had to get medication for the nausea    Pt stated more nausea  feeling than headache and had to get medication for the nausea      Other reaction(s): Headache Pt stated more nausea  feeling than headache and had to get medication for the nausea      Other reaction(s): Headache Pt stated more nausea  feeling than headache and had to get medication for the nausea         Ht 160 cm (63\")   Wt 57.2 kg (126 lb)   BMI 22.32 kg/m²       Physical Exam:  Constitutional: Patient appears well-developed, well-nourished, well-hydrated, appears younger than stated age  HEENT: Head: Normocephalic and atraumatic  Eyes: Conjunctivae and lids are normal  Pupils: Equal, round, reactive to " light  Peripheral vascular exam: Posterior tibialis: right 2+ and left 2+. Dorsalis pedis: right 2+ and left 2+. No edema.  Musculoskeletal   Gait and station: Gait evaluation demonstrated a normal gait. Able to walk on heels, toes, tandem walking   Lumbar Spine: Passive and active range of motion are almost full and without pain. Extension, flexion, lateral flexion, rotation of the lumbar spine did not increase or reproduce pain. Lumbar facet joint loading maneuvers are negative.   Sacroiliac Joints: Javed's test; Gaenslen's test; thigh thrust test; SI compression test; posterior shear test; SI distraction test; pelvic rock test; Yeoman's test: Negative   Piriformis maneuvers: Negative   Right Hip Joint: The range of motion of the hip joint is limited to flexion and internal/external rotation but without pain   Left Hip Joint: The range of motion of the hip joint is limited to flexion and internal/external rotation but without pain   Palpation of the bilateral ischial tuberosities: Unrevealing  Palpation and gentle mobilization of the coccyx: Did not cause or reproduce pain  Palpation of the bilateral psoas tendons and iliopsoas bursas: Unrevealing   Palpation of the bilateral greater trochanters: Unrevealing   Examination of the Iliotibial band: Unrevealing   Neurological:   Patient is alert and oriented to person, place, and time.   Speech: Normal.   Cortical function: Normal mental status.   Reflex Scores:  Right patellar: 1+  Left patellar: 1+  Right Achilles: 0+  Left Achilles: 0+  Motor strength: 5/5  Motor Tone: Normal  Involuntary movements: None.   Superficial/Primitive Reflexes: Primitive reflexes were absent.   Right Johnson: Absent  Left Johnson: Absent  Right ankle clonus: Absent  Left ankle clonus: Absent   Babinsky: Absent  Long tract signs: Negative. Straight leg raising test: Negative. Femoral stretch sign: Negative.   Sensory exam: Intact to light touch, intact pain and temperature sensation,  intact vibration sensation and normal proprioception  Coordination: Finger to nose: Normal. Balance:Normal Romberg's sign: Negative   Skin and subcutaneous tissue: Skin is warm and intact. No rash noted. No cyanosis.   Psychiatric: Judgment and insight: Normal. Recent and remote memory: Intact. Mood and affect: Normal.       ASSESSMENT:   1. Entrapment neuropathy: Bilateral Medial Cluneal nerves    2. Lumbar stenosis with neurogenic claudication    3. Lumbar discogenic pain syndrome    4. Degeneration of lumbosacral intervertebral disc    5. Polyneuropathy          PLAN/MEDICAL DECISION MAKING:  Ms. Park Galvez, 64 y.o. female presents with a greater than 1 year history of chronic intractable lower back and sacral pain, which began after a fall. Patient was initially diagnosed with pudendal neuralgia. Patient does not meet the essential [place] Diagnostic Criteria for Pudendal Neuralgia by Pudendal Nerve Entrapment: Pain was not in the territory of the pudendal nerve and pain was not relieved by diagnostic pudendal nerve block. She was also diagnosed with sacroiliac joint dysfunction and with coccydynia; which she does not meet the criteria at this time.  She reports symptoms consistent with polyneuropathy, neurogenic claudication, as well as potential peripheral nerve entrapment, the latter by mechanism of her injury. Flexion-extension x-rays of the lumbar spine on 03/13/2024 revealed multilevel degenerative changes with significant facet hypertrophy of the lower lumbar spine. Grade 1 anterolisthesis of L4 on L5 without evidence of instability. MRI of the sacrum with and without contrast on March 12, 2024 revealed moderate arthritic changes of the sacroiliac joints with osteophyte formation. Evidence of a very small Tarlov cyst in the sacrum without criteria for being symptomatic (<1 cm). MRI of the lumbar spine with and without contrast March 12, 2024. Grade 1 anterolisthesis of L4 on L5. No abnormal enhancing  "lesions after administration of contrast.  At L4-L5: Disc osteophyte complex formation, advanced facet hypertrophy. Anterior listhesis of L4 on 5 with bilateral joint effusions. Ligamentum flavum hypertrophy.  Moderate canal, lateral recess, and NF stenosis. EMG/NCV by Dr. Javed Billingsley on 10/6/2022: Mild peripheral neuropathy. Sacral nerve roots are normal. Patient describes burning,numbness, and tingling in her feet and lower legs consistent with polyneuropathy.  In addition, she reports symptoms consistent with lumbar stenosis with neurogenic claudication as supported by her MRI. Park Galvez has failed to obtain pain relief with conservative measures for more than 12 months including oral analgesics, topical analgesics, ice, heat, physical therapy (ongoing, last visit last week), physical therapist directed home exercise program HEP (ongoing), chiropractic therapy (in the past), therapeutic massage, to name a few. She has been treated with pelvic floor therapy and PRP therapy. She has had a couple of pudendal nerve blocks without relief. Dr. Martinez performed several treatments with PRP, prolotherapy to the SI joints, etc; Dr. Thompson performed pudendal blocks, ganglion impar blocks w/o relief, GYN performed pudendal nerve block under CT w/o relief, and at [facility], she underwent RT Obturator internus TPI w/o relief. Park Galvez underwent neurosurgical consultation with Argenis Kumar PA-C and was found not to be a surgical candidate. Pain has progressed in intensity over the past years. Park Galvez underwent neurosurgical consultation with Argenis Kumar PA-C and was found not to be a surgical candidate. Park Galvez underwent psychological evaluation on 04/16/2024 with Gale Coe, PhD: \"Patient is considered appropriate for a scs or Intrathecal pain pump at this time.\" A comprehensive evaluation including history and physical exam along with pertinent physiologic and functional " assessment was performed. Patient presents with intractable pain due to the diagnoses listed above. Patient has failed to respond to conservative modalities and previous minimally invasive interventional pain management measures, as referenced under HPI. I have documented the impact of patient's moderate-to-severe pain contributing to significant impairment in daily activities, ADLs, and a negative impact on the patient's quality of life, as reflected on Global Pain Scale 37/100; The [place] Back Pain Disability Scale 43/100; [place] Claudication Score 21/50; [place] Gait & Balance Assessment Tool  (low risk for falls). I have reviewed pertinent supporting diagnostic studies of patient's chronic pain condition as well as all available pertinent medical records to patient's chronic pain condition including previous therapies, as referenced above. PHQ-2 Depression Screening 0; Pain Self-Efficacy Questionnaire (PSEQ) 30/60. I had a lengthy conversation with Ms. Park Galvez regarding her chronic pain condition and potential therapeutic options including risks, benefits, alternative therapies, to name a few. We have discussed using a stepwise approach starting with the least intense level of care as determined by the extent required to diagnose and or treat a patient's condition. The proposed treatments are consistent with the patient's medical condition and known to be safe and effective by current guidelines and the standard of care. The duration and frequency proposed are considered appropriate for the service in accordance with accepted standards of medical practice for the diagnosis and treatment of the patient's condition and intended to improve the patient's level of function. These services will be furnished in a setting appropriate to the patient's medical needs and condition. Therefore, I have proposed the following plan:    1. Interventional pain management measures: Patient does not take blood thinners.  Patient will be scheduled for diagnostic bilateral medial cluneal nerve blocks by hydrodissection technique under ultrasound guidance, C arm fluoroscopic guidance, and PNS guidance.  If the patient experiences significant pain relief, then, the patient will be scheduled for diagnostic and therapeutic bilateral medial cluneal nerve blocks with local anesthetic and steroids by hydrodissection technique under ultrasound guidance, C arm fluoroscopic guidance, and PNS guidance.  We could also consider placement of a Sprint PNS system Extensa dual-lead system to the bilateral medial cluneal nerves.  Other options include DRG to the S2 and S3 dorsal ganglions, diagnostic and therapeutic bilateral L4-L5 transforaminal epidural steroid injections, ViaDisc (L4-L5), PRP, SCS    2. Diagnostic studies:   A. Please request report of the last EMG/NCV with Dr. Billingsley. Otherwise, we will obtain new EMG/NCV  B. Prior to spinal cord stimulator trial and implant: MRI of the thoracic spine without contrast to assess capacity and patency of the spinal canal and epidural space   C. Prior to SCS: CBC, PT, PTT     3. Pharmacological measures: Reviewed and discussed;   A. Patient takes gabapentin 300 mg TID, Tylenol 1000 mg TID. Patient also takes alendronate (Fosamax)  B. Trial with Rheumate one tablet once daily  C. Start pyridoxine 100 mg one tablet by mouth daily take for 30 days, #30, no refills  D. Start alpha lipoid acid 3317-4774 mg per day divided into 3 doses  E. Trial with prilocaine 2%, lidocaine 10%, imipramine 3%, capsaicin 0.001%, and mannitol 20%  cream, apply 1 to 2 grams of cream to the affected areas every 4 to 6 hours as needed    4. Long-term rehabilitation efforts:  A. The patient does not have a history of falls. Also, I performed a risk assessment for falls using the Tinetti gait & balance assessment tool (scored low risk for falls).   B. Patient will start a comprehensive physical therapy program at [facility]  "Therapy for Alter-G, neurodynamics, core strengthening, gluteal and abductor strengthening, ultrasound, ASTYM, E-STIM, myofascial release, cupping,  dry needling, home exercise program, 2-3 x per week for 8 weeks  C. Contrast therapy: Apply ice-packs for 15-20 minutes, followed by heating pads for 15-20 minutes to affected area   D. Start a low impact exercise program such as water therapy, swimming, yoga, Pilates  E. Pulsed Electro-Magnetic Field (PEMF) Therapy device \"Oska Pulse\" for treatment of patient's chronic pain and inflammation  F. Park Galvez  reports that she has never smoked. She has never used smokeless tobacco.     5. The patient has been instructed to contact my office with any questions or difficulties. The patient understands the plan and agrees to proceed accordingly.    The patient has a documented plan of care to address chronic pain. Park Galvez reports a pain score of 9/10.  Given her pain assessment as noted, treatment options were discussed and the following options were decided upon as a follow-up plan to address the patient's pain: continuation of current treatment plan for pain, educational materials on pain management, home exercises and therapy, prescription for non-opiod analgesics, referral to Physical Therapy, referral to specialist for assistance in pain treatment guidance, steroid injections, use of non-medical modalities (ice, heat, stretching and/or behavior modifications), and interventional pain management measures including neuromodulation techniques .               Pain Management Panel          Latest Ref Rng & Units 10/2/2023   Pain Management Panel   Creatinine, Urine mg/dL 113.9         JAI query complete. JAI reviewed by Fer Roper MD.     Pain Medications               gabapentin (NEURONTIN) 300 MG capsule TAKE 1 CAPSULE BY MOUTH THREE TIMES DAILY             No orders of the defined types were placed in this encounter.       Time spent " face-to-face with the patient: 52 minutes  Time spent reviewing diagnostic studies, consultation reports, previous treatments, ordering diagnostic studies, referrals, and writing this note: 22 minutes  Total Time: 77  minutes      Please note that portions of this note were completed with a voice recognition program.   Any copied data in any portion of my note has been reviewed by myself and accurate.     The 21st Century Cures Act makes medical notes like this available to patients in the interest of transparency. This is a medical document intended as peer to peer communication. It is written in medical language and may contain abbreviations or verbiage that are unfamiliar. It may appear blunt or direct. Medical documents are intended to carry relevant information, facts as evident, and the clinical opinion of the practitioner.     Fer Roper MD    Patient Care Team:  Felicita Leslie DO as PCP - General (Family Medicine)  Luis Cuello MD as Consulting Physician (Ophthalmology)  Mesha Martinez MD as Consulting Physician (Pain Medicine)     No orders of the defined types were placed in this encounter.        No future appointments.

## 2024-05-20 DIAGNOSIS — M53.3 COCCYDYNIA: Chronic | ICD-10-CM

## 2024-05-20 RX ORDER — GABAPENTIN 300 MG/1
300 CAPSULE ORAL 3 TIMES DAILY
Qty: 90 CAPSULE | OUTPATIENT
Start: 2024-05-20

## 2024-05-20 NOTE — TELEPHONE ENCOUNTER
Rx Refill Note  Requested Prescriptions     Pending Prescriptions Disp Refills    gabapentin (NEURONTIN) 300 MG capsule [Pharmacy Med Name: GABAPENTIN 300MG CAPSULES] 90 capsule      Sig: TAKE 1 CAPSULE BY MOUTH THREE TIMES DAILY      Last refill:  4/19/24 #90/1  Last office visit with prescribing clinician: 11/3/2023   Last telemedicine visit with prescribing clinician: Visit date not found   Next office visit with prescribing clinician: Visit date not found   {TIP  Encounters:23}    {TIP  Please add Last Relevant Lab Date if appropriate:23}              {TIP  Is Refill Pharmacy correct?:23}    Would you like a call back once the refill request has been completed: [] Yes [] No    If the office needs to give you a call back, can they leave a voicemail: [] Yes [] No    Bhakti Heaton RN  05/20/24, 13:06 EDT

## 2024-05-21 ENCOUNTER — OFFICE VISIT (OUTPATIENT)
Dept: PAIN MEDICINE | Facility: CLINIC | Age: 64
End: 2024-05-21
Payer: MEDICARE

## 2024-05-21 VITALS — WEIGHT: 126 LBS | BODY MASS INDEX: 22.32 KG/M2 | HEIGHT: 63 IN

## 2024-05-21 DIAGNOSIS — M51.26 LUMBAR DISCOGENIC PAIN SYNDROME: ICD-10-CM

## 2024-05-21 DIAGNOSIS — G62.9 POLYNEUROPATHY: ICD-10-CM

## 2024-05-21 DIAGNOSIS — M48.062 LUMBAR STENOSIS WITH NEUROGENIC CLAUDICATION: ICD-10-CM

## 2024-05-21 DIAGNOSIS — M51.37 DEGENERATION OF LUMBOSACRAL INTERVERTEBRAL DISC: ICD-10-CM

## 2024-05-21 DIAGNOSIS — G58.9 ENTRAPMENT NEUROPATHY: Primary | ICD-10-CM

## 2024-05-21 DIAGNOSIS — G58.9 ENTRAPMENT NEUROPATHY: ICD-10-CM

## 2024-05-21 PROBLEM — M51.379 DEGENERATION OF LUMBOSACRAL INTERVERTEBRAL DISC: Status: ACTIVE | Noted: 2024-05-21

## 2024-05-21 PROBLEM — M51.360 LUMBAR DISCOGENIC PAIN SYNDROME: Status: ACTIVE | Noted: 2024-05-21

## 2024-05-21 RX ORDER — ATORVASTATIN CALCIUM 20 MG/1
TABLET, FILM COATED ORAL
COMMUNITY

## 2024-05-22 ENCOUNTER — OUTSIDE FACILITY SERVICE (OUTPATIENT)
Dept: PAIN MEDICINE | Facility: CLINIC | Age: 64
End: 2024-05-22
Payer: MEDICARE

## 2024-05-22 ENCOUNTER — TELEPHONE (OUTPATIENT)
Dept: PAIN MEDICINE | Facility: CLINIC | Age: 64
End: 2024-05-22

## 2024-05-22 RX ORDER — ME-TETRAHYDROFOLATE/B12/HRB236 1-1-500 MG
1 CAPSULE ORAL DAILY
Qty: 90 CAPSULE | Refills: 0 | Status: SHIPPED | OUTPATIENT
Start: 2024-05-22

## 2024-05-22 RX ORDER — SAW/PYGEUM/BETA/HERB/D3/B6/ZN 30 MG-25MG
400 CAPSULE ORAL 3 TIMES DAILY
Qty: 180 CAPSULE | Refills: 1 | Status: SHIPPED | OUTPATIENT
Start: 2024-05-22

## 2024-05-22 RX ORDER — MULTIVITAMIN WITH IRON
100 TABLET ORAL DAILY
Qty: 30 TABLET | Refills: 0 | Status: SHIPPED | OUTPATIENT
Start: 2024-05-22

## 2024-05-22 NOTE — TELEPHONE ENCOUNTER
Spoke with pt and advised that we are unsure of what time we can do the procedure, and unsure of what location but we are currently working on it. I advised that I will call in the morning to confirm.

## 2024-05-22 NOTE — TELEPHONE ENCOUNTER
Caller: MIHAELA COLEY    Phone Number: [phone] (home)     Reason for Call:   PATIENT CALLED IN ASKED TO SPEAK TO ANTHONY DUE TO DR. HERRING STATING SHE CAN GET AN ADDITIONAL INJECTION ON TOMORROW 5-23-24 WANTING TO GET SCHEDULED FOR ADDITIONAL INJECTION .

## 2024-05-23 ENCOUNTER — OUTSIDE FACILITY SERVICE (OUTPATIENT)
Dept: PAIN MEDICINE | Facility: CLINIC | Age: 64
End: 2024-05-23
Payer: MEDICARE

## 2024-05-23 DIAGNOSIS — G58.9 ENTRAPMENT NEUROPATHY: Primary | ICD-10-CM

## 2024-05-23 PROCEDURE — 76942 ECHO GUIDE FOR BIOPSY: CPT | Performed by: ANESTHESIOLOGY

## 2024-05-23 PROCEDURE — 64450 NJX AA&/STRD OTHER PN/BRANCH: CPT | Performed by: ANESTHESIOLOGY

## 2024-06-10 ENCOUNTER — TELEPHONE (OUTPATIENT)
Dept: PAIN MEDICINE | Facility: CLINIC | Age: 64
End: 2024-06-10

## 2024-06-10 NOTE — TELEPHONE ENCOUNTER
Hub staff attempted to follow warm transfer process and was unsuccessful     Caller: Park Galvez    Relationship to patient: Self    Best call back number: [phone]    Patient is needing: PATIENT WOULD LIKE A CALL BACK FROM [place]

## 2024-06-10 NOTE — TELEPHONE ENCOUNTER
Spoke with pt who is inquiring if her inferior medial muscle is inclined. She advised that sitting on certain surfaces seem to be hurting her more, especially when sitting on super soft surfaces, and it seems to be making her shaky, and the pain is intense right below her bottom and where the hamstring comes up to her bottom. Pt is inquiring what she can do. I advised I would send a message to Dr. Roper. No further needs expressed.

## 2024-07-02 ENCOUNTER — OFFICE VISIT (OUTPATIENT)
Dept: PAIN MEDICINE | Facility: CLINIC | Age: 64
End: 2024-07-02
Payer: MEDICARE

## 2024-07-02 VITALS — BODY MASS INDEX: 22.15 KG/M2 | HEIGHT: 63 IN | WEIGHT: 125 LBS

## 2024-07-02 DIAGNOSIS — G62.9 POLYNEUROPATHY: ICD-10-CM

## 2024-07-02 DIAGNOSIS — M48.062 LUMBAR STENOSIS WITH NEUROGENIC CLAUDICATION: ICD-10-CM

## 2024-07-02 DIAGNOSIS — M53.3 SACROILIAC JOINT DYSFUNCTION OF BOTH SIDES: ICD-10-CM

## 2024-07-02 DIAGNOSIS — G58.9 ENTRAPMENT NEUROPATHY: ICD-10-CM

## 2024-07-02 PROCEDURE — 99214 OFFICE O/P EST MOD 30 MIN: CPT | Performed by: NURSE PRACTITIONER

## 2024-07-02 PROCEDURE — 1160F RVW MEDS BY RX/DR IN RCRD: CPT | Performed by: NURSE PRACTITIONER

## 2024-07-02 PROCEDURE — 1125F AMNT PAIN NOTED PAIN PRSNT: CPT | Performed by: NURSE PRACTITIONER

## 2024-07-02 PROCEDURE — 1159F MED LIST DOCD IN RCRD: CPT | Performed by: NURSE PRACTITIONER

## 2024-07-02 NOTE — PROGRESS NOTES
"Chief Complaint: \"Bilateral gluteal and sacral pain\"        History of Present Illness:   Patient: Ms. Park Galvez, 64 y.o. female originally referred by Argenis Kumar PA-C in consultation for chronic intractable lower back and sacral pain.  She reports a greater than 1 year history of chronic intractable lower back and sacral pain which began after a fall.  She has been a patient of Dr. Martinez, prior to being new to our clinic. Dr. Martinez performed several treatments with PRP, prolotherapy to the SI joints, etc; Dr. Thompson performed pudendal blocks, ganglion impar blocks w/o relief, GYN performed pudendal nerve block under CT w/o relief, and at [facility], she underwent RT Obturator internus TPI w/o relief. Flexion-extension x-rays of the lumbar spine on 03/13/2024 revealed multilevel degenerative changes with significant facet hypertrophy of the lower lumbar spine.  Grade 1 anterolisthesis of L4 on L5 without evidence of instability.  MRI of the sacrum with and without contrast on March 12, 2024 revealed moderate arthritic changes of the sacroiliac joints with osteophyte formation. Evidence of a small Tarlov cysts in the sacrum.  No abnormal enhancing lesions after administration of contrast. MRI of the lumbar spine with and without contrast March 12, 2024.  Possible transitional lumbar anatomy with partial sacralization of L5.  Rightward rotatory scoliotic curvature of the lumbar spine. Grade 1 retrolisthesis of T12 on L1.  Grade 1 anterolisthesis of L4 on L5. There are perineural cyst formations around the exiting L5 nerve roots and sacral Tarlov cysts.  No abnormal enhancing lesions after administration of contrast.  At L4-L5: Disc osteophyte complex formation, advanced facet hypertrophy. Anterior listhesis of L4 on 5 with bilateral joint effusions. Ligamentum flavum hypertrophy.  Moderate canal, lateral recess, and neuroforaminal stenosis. There is a periarticular cyst formation on the left.  EMG/NCV " of the bilateral lower extremities by Dr. Javed Billingsley on 10/6/2022 revealed mild peripheral neuropathy. Sacral nerve roots are normal.  She has failed to obtain pain relief with conservative measures including oral analgesics, topical analgesics, ice, heat, physical therapy, physical therapist directed home exercise program HEP (ongoing), chiropractic therapy, therapeutic massage, to name a few. She has been treated with pelvic floor therapy and PRP therapy. She underwent neurosurgical consultation with Argenis Kumar PA-C and was found not to be a surgical candidate.  On 5/20/2024 she underwent diagnostic bilateral medial cluneal nerve blocks, with local anesthetics by hydrodissection technique under ultrasound and fluoroscopic guidance, from which she reported 100% relief immediately after the procedure.  She did continue to report some pain in the right ischial tuberosity.  On 5/23/2024 she underwent therapeutic bilateral superior cluneal nerve blocks combined with therapeutic bilateral medial cluneal nerve blocks, from which overall she has reported today to me, no real difference in her symptoms.  She continues to experience most of her pain while sitting, she describes this as burning, and potentially begins in her sacral area. She has been participating in further physical therapy as instructed.  Pain Description: Constant lower back/sacrum/gluteal pain with intermittent exacerbation, described as aching, dull, sharp, throbbing, and burning sensation.   Radiation of Pain: The pain radiates into the gluteal region  Pain intensity today: 7/10   Average pain intensity last week: 8/10  Pain intensity ranges from: 5/10 to 10/10  Aggravating factors: Pain increases with bending forward, protracted sitting, standing for too long, walking.   Alleviating factors: Pain decreases with lying down on her side, changing positions  Associated Symptoms:   Patient reports intermittent numbness and tingling from her  "knees down to her feet   Patient denies any new bladder or bowel problems.   Patient denies difficulties with her balance or recent falls.   Pain interferes with general activities and affects patient's quality of life  Pain does not interfere with sleep  Muscle spasms  Stiffness    Review of previous therapies and additional medical records:  Park Galvez has already failed the following measures, including:   Conservative Measures: Oral analgesics, topical analgesics, ice, heat, physical therapy (ongoing), physical therapist directed home exercise program HEP (ongoing), chiropractic therapy (in the past), therapeutic massage  Interventional Measures: Dr. Martinez (PRP, prolotherapy), Dr. Thompson (pudendal blocks, ganglion impar),  (RT Obturator internus TPI)  05/23/2024: Therapeutic bilateral cluneal superior and medial nerve blocks- no relief  Surgical Measures: No history of previous cervical spine, lumbar spine or hip surgery   Park Galvez underwent neurosurgical consultation with Argenis Kumar PA-C and was found not to be a surgical candidate.  [facility] psychological evaluation with Gale Coe, PhD on 4/16/2024: \"Patient is considered appropriate for a scs or Intrathecal pain pump at this time.\"  Park Galvez presents with significant comorbidities including osteoporosis, HLP   In terms of current analgesics, Park Galvez takes:  gabapentin, Tylenol. Patient also takes alendronate (Fosamax)  I have reviewed Vamsi Report consistent with medication reconciliation.  SOAPP/ORT: Low Risk       Global Pain Scale 05-21 2024          Pain 17          Feelings 2          Clinical outcomes 11          Activities 4          GPS Total: 37            The [place] Back Pain Disability Scale   DATE 05-21 2024          Sleep through the night 2          Turn over in bed 1          Get out of bed 1          Make your bed 1          Put on socks (pantyhose) 2          Ride in a car 2          Sit in a " chair for several hours 4          Stand up for 20-30 minutes 4          Climb one flight of stairs 3          Walk a few blocks (200-300 yards)  2          Walk several miles 2          Run one block (about 50 yards) 4          Take food out of the refrigerator 1          Reach up to high shelves 1          Move a chair 1          Pull or push heavy doors 3          Bend over to clean the bathtub 3          Throw a ball 1          Carry two bags of groceries 1          Lift and carry a heavy suitcase 4          Total score 43              [place] Diagnostic Criteria for Pudendal Neuralgia by Pudendal Nerve Entrapment:    Patient did not meet the essential criteria  Pain in the territory of the pudendal nerve: from the anus to the penis or clitoris: Not met  Pain is predominantly experienced while sitting: Met  The pain does not wake the patient at night: Met  Pain with no objective sensory impairment: Met  Pain relieved by diagnostic pudendal nerve block: Not met    Review of Diagnostic Studies:   Flexion-extension x-rays of the lumbar spine 03/13/2024 revealed grade 1 anterolisthesis of L4 on L5 without evidence of instability.  Multilevel degenerative changes with significant facet hypertrophy of the lower lumbar spine.    MRI of the sacrum with and without contrast on 3/12/2024 revealed moderate arthritic changes of the sacroiliac joints with osteophyte formation..  Evidence of Tarlov cysts in the sacrum.  There are no abnormal enhancing lesions after administration of contrast.  MRI of the lumbar spine with and without contrast 3/12/2024 there could be transitional lumbar anatomy with partial sacralization of L5.  There is rightward rotatory scoliotic curvature of the lumbar spine.  Grade 1 retrolisthesis of T12 on L1.  Grade 1 anterolisthesis of L4 on L5.  There are perineural cyst formations around the exiting L5 nerve roots and sacral Tarlov cysts.  There are no abnormal enhancing lesions after administration  of contrast.    T12-L1: Disc osteophyte complex formation.  No significant canal or foraminal stenosis  L1-L2, L2-L3: Disc bulge, facet hypertrophy.  No significant canal or foraminal stenosis  L3-L4: Small disc osteophyte complex formation.  Facet hypertrophy.  No significant canal stenosis.  Mild foraminal stenosis  L4-L5: Disc osteophyte complex formation, advanced facet hypertrophy with fluid signal interposed between the articular facet surfaces.  Ligamentum flavum hypertrophy.  Moderate canal, lateral recess and foraminal stenosis.  Periarticular cyst formation on the left.  L5-S1: Small disc bulge.  No significant canal or foraminal stenosis  EMG/NCV by Dr. Javed Billingsley on 10/6/2022: Mild peripheral neuropathy.  Sacral nerve roots are normal. 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   Genitourinary:  Positive for pelvic pain.   Musculoskeletal:  Positive for arthralgias, back pain, myalgias and neck stiffness.   Neurological:  Positive for numbness.   All other systems reviewed and are negative.        Patient Active Problem List   Diagnosis    Coccydynia    Age-related osteoporosis without current pathological fracture    Hypercholesterolemia    Fuchs' corneal dystrophy of both eyes    Urinary urgency    Idiopathic peripheral neuropathy    Pelvic floor dysfunction    Numbness and tingling of both legs    Entrapment neuropathy    Lumbar stenosis with neurogenic claudication    Lumbar discogenic pain syndrome    Degeneration of lumbosacral intervertebral disc    Polyneuropathy       Past Medical History:   Diagnosis Date    Allergic     Macrobid    Cataract     Removal right eye    Cervical disc disorder     Chronic pain disorder     Degeneration of lumbosacral intervertebral disc 5/21/2024    Hyperlipidemia     Taking Lipitor    Low back pain     Neck pain     Neuromuscular disorder     Pelvic floor trt    Osteoarthritis      Osteopenia     Taking Fosamax 3 yrs    Spinal stenosis          Past Surgical History:   Procedure Laterality Date    COLONOSCOPY  Jan 2023    Nml    CORNEAL TRANSPLANT Right 2017    PARTIAL REPLACEMENT    EPIDURAL BLOCK  Caudal Nov 2023    HERNIA REPAIR      HYSTERECTOMY  03/2022    and BSO    INGUINAL HERNIA REPAIR      Sx tepair    TRIGGER POINT INJECTION      UMBILICAL HERNIA REPAIR      Sx repair         Family History   Problem Relation Age of Onset    Miscarriages / Stillbirths Mother     Dementia Mother     Heart disease Father     Hyperlipidemia Father     Vision loss Father         Glaucoma    Hypertension Sister     Heart failure Sister     Sleep apnea Sister     Stroke Brother     Stroke Maternal Grandfather     Lung disease Maternal Grandfather     Obesity Paternal Grandmother     Pulmonary embolism Paternal Grandmother     Breast cancer Neg Hx     Ovarian cancer Neg Hx          Social History     Socioeconomic History    Marital status:    Tobacco Use    Smoking status: Never    Smokeless tobacco: Never   Vaping Use    Vaping status: Never Used   Substance and Sexual Activity    Alcohol use: Yes     Alcohol/week: 10.0 standard drinks of alcohol     Comment: Occas    Drug use: Never    Sexual activity: Not Currently     Partners: Male     Birth control/protection: Hysterectomy           Current Outpatient Medications:     alendronate (FOSAMAX) 70 MG tablet, TAKE 1 TABLET BY MOUTH WEEKLY IN THE MORNING 30 MINUTES BEFORE FOOD, DRINK OR OTHER MEDICATIONS, Disp: 4 tablet, Rfl: 5    atorvastatin (Lipitor) 20 MG tablet, , Disp: , Rfl:     estradiol (ESTRACE) 0.1 MG/GM vaginal cream, Insert 2 g into the vagina 2 (Two) Times a Week., Disp: , Rfl:     fluorometholone (FML) 0.1 % ophthalmic suspension, Administer 1 drop to the right eye Daily., Disp: , Rfl:     gabapentin (NEURONTIN) 300 MG capsule, TAKE 1 CAPSULE BY MOUTH THREE TIMES DAILY, Disp: 90 capsule, Rfl: 1    multivitamin (THERAGRAN) tablet  "tablet, Take 1 tablet by mouth Daily., Disp: , Rfl:     Omega-3 Fatty Acids (fish oil) 1000 MG capsule capsule, Take 2 capsules by mouth Daily., Disp: , Rfl:     cholecalciferol (Vitamin D, Cholecalciferol,) 25 MCG (1000 UT) tablet, Take 1 tablet by mouth Daily., Disp: , Rfl:     vitamin B-6 (PYRIDOXINE) 100 MG tablet, Take 1 tablet by mouth Daily., Disp: 30 tablet, Rfl: 0      Allergies   Allergen Reactions    Macrobid [Nitrofurantoin] Hives    Amitriptyline Other (See Comments)     Shaky and insomnia. Sleep distrubution.    Shaky and insomnia. Sleep distrubution.     Shaky and insomnia. Sleep distrubution.    Shaky and insomnia. Sleep distrubution.      Shaky and insomnia. Sleep distrubution.  Shaky and insomnia. Sleep distrubution.   Shaky and insomnia. Sleep distrubution.      Shaky and insomnia. Sleep distrubution.  Shaky and insomnia. Sleep distrubution.    Buspirone Palpitations    Lexapro [Escitalopram] Palpitations    Oxycodone-Acetaminophen Other (See Comments) and Headache     Other reaction(s): Headache   Pt stated more nausea  feeling than headache and had to get medication for the nausea    Pt stated more nausea  feeling than headache and had to get medication for the nausea      Other reaction(s): Headache Pt stated more nausea  feeling than headache and had to get medication for the nausea      Other reaction(s): Headache Pt stated more nausea  feeling than headache and had to get medication for the nausea         Ht 160 cm (62.99\")   Wt 56.7 kg (125 lb)   BMI 22.15 kg/m²       Physical Exam:  Constitutional: Patient appears well-developed, well-nourished, well-hydrated, appears younger than stated age  HEENT: Head: Normocephalic and atraumatic  Eyes: Conjunctivae and lids are normal  Pupils: Equal, round, reactive to light  Peripheral vascular exam: Posterior tibialis: right 2+ and left 2+. Dorsalis pedis: right 2+ and left 2+. No edema.  Musculoskeletal   Gait and station: Gait evaluation " demonstrated a normal gait.   Lumbar Spine: Passive and active range of motion are almost full but reproduce pain pain. Extension, flexion, lateral flexion, rotation of the lumbar spine increased and reproduced pain. Lumbar facet joint loading maneuvers are equivocal.   Javed's test (equivocal on the right), Gaenslen's test (negative), SI compression test (positive), pelvic rock test (produces burning) Yeoman's test (Negative)   Piriformis maneuvers: Negative   The range of motion of the right hip joint is limited to flexion and internal/external rotation but without pain,  the range of motion of the left hip joint is almost full and without pain  There is tenderness with palpation on the area of the bilateral superior and medial cluneal nerves  Palpation of the bilateral ischial tuberosities: Unrevealing  Palpation and mobilization of the coccyx: Reproduces pain  Palpation of the bilateral psoas tendons and iliopsoas bursas: Unrevealing   Palpation of the bilateral greater trochanters: Unrevealing   Examination of the Iliotibial band: Unrevealing   Neurological:   Patient is alert and oriented to person, place, and time.   Speech: Normal.   Cortical function: Normal mental status.   Reflex Scores:  Right patellar: 1+  Left patellar: 1+  Right Achilles: 0+  Left Achilles: 0+  Motor strength: 5/5  Motor Tone: Normal  Involuntary movements: None.   Superficial/Primitive Reflexes: Primitive reflexes were absent.   Right Johnson: Absent  Left Johnson: Absent  Right ankle clonus: Absent  Left ankle clonus: Absent   Babinsky: Absent  Long tract signs: Negative. Straight leg raising test: Negative. Femoral stretch sign: Negative.   Sensory exam: Intact to light touch, intact pain and temperature sensation, intact vibration sensation and normal proprioception  Coordination: Finger to nose: Normal. Balance:Normal Romberg's sign: Negative   Skin and subcutaneous tissue: Skin is warm and intact. No rash noted. No cyanosis.    Psychiatric: Judgment and insight: Normal. Recent and remote memory: Intact. Mood and affect: Normal.       ASSESSMENT:   1. Entrapment neuropathy    2. Sacroiliac joint dysfunction of both sides    3. Lumbar stenosis with neurogenic claudication    4. Polyneuropathy            PLAN/MEDICAL DECISION MAKING:  Ms. Park Galvez, 64 y.o. female presents with a greater than 1 year history of chronic intractable lower back and sacral pain, which began after a fall. Patient was initially diagnosed with pudendal neuralgia. Patient does not meet the essential [place] Diagnostic Criteria for Pudendal Neuralgia by Pudendal Nerve Entrapment: Pain was not in the territory of the pudendal nerve and pain was not relieved by diagnostic pudendal nerve block. She was also diagnosed with sacroiliac joint dysfunction and with coccydynia; which she does not meet the criteria at this time.  She reports symptoms consistent with polyneuropathy, neurogenic claudication, as well as potential peripheral nerve entrapment, the latter by mechanism of her injury. She has been a patient of Dr. Martinez, prior to being new to our clinic. Dr. Martinez performed several treatments with PRP, prolotherapy to the SI joints, etc; Dr. Thompson performed pudendal blocks, ganglion impar blocks w/o relief, GYN performed pudendal nerve block under CT w/o relief, and at [facility], she underwent RT Obturator internus TPI w/o relief. Flexion-extension x-rays of the lumbar spine on 03/13/2024 revealed multilevel degenerative changes with significant facet hypertrophy of the lower lumbar spine.  Grade 1 anterolisthesis of L4 on L5 without evidence of instability.  MRI of the sacrum with and without contrast on March 12, 2024 revealed moderate arthritic changes of the sacroiliac joints with osteophyte formation. Evidence of a small Tarlov cysts in the sacrum.  No abnormal enhancing lesions after administration of contrast. MRI of the lumbar spine with and  without contrast March 12, 2024.  Possible transitional lumbar anatomy with partial sacralization of L5.  Rightward rotatory scoliotic curvature of the lumbar spine. Grade 1 retrolisthesis of T12 on L1.  Grade 1 anterolisthesis of L4 on L5. There are perineural cyst formations around the exiting L5 nerve roots and sacral Tarlov cysts.  No abnormal enhancing lesions after administration of contrast.  At L4-L5: Disc osteophyte complex formation, advanced facet hypertrophy. Anterior listhesis of L4 on 5 with bilateral joint effusions. Ligamentum flavum hypertrophy.  Moderate canal, lateral recess, and neuroforaminal stenosis. There is a periarticular cyst formation on the left.  EMG/NCV of the bilateral lower extremities by Dr. Javed Billingsley on 10/6/2022 revealed mild peripheral neuropathy. Sacral nerve roots are normal.  She has failed to obtain pain relief with conservative measures including oral analgesics, topical analgesics, ice, heat, physical therapy, physical therapist directed home exercise program HEP (ongoing), chiropractic therapy, therapeutic massage, to name a few. She has been treated with pelvic floor therapy and PRP therapy. She underwent neurosurgical consultation with Argenis Kumar PA-C and was found not to be a surgical candidate.  I had a lengthy conversation with Ms. Park Galvez regarding her chronic pain condition and potential therapeutic options including risks, benefits, alternative therapies, to name a few.  Unfortunately, despite several pain management injections, as well as therapy she still struggling with bilateral gluteal and sacral pain.  She did find therapeutic benefit with the diagnostic cluneal nerve block, unfortunately the therapeutically on her blocks did not provide any difference in her symptoms.  She does continue to have positive findings for potential cluneal nerve entrapment, there may be a component of pudendal neuralgia, as she does have some pain with  mobilization of her coccyx today.  There are also some positive findings for sacroiliac joint dysfunction.  We have discussed using a stepwise approach, she is going to consult with a new physical therapist in hopes that she will find some benefit, and relief of her symptoms.  Additionally, through process of elimination, she could have multiple contributing factors to her pain.  She could be suffering from ongoing sacral coccygeal issues, as well as some nerve entrapment with some SI joint dysfunction.  She will keep me updated on her ongoing progress with physical therapy.  In the meantime, I will consult with Dr. Roper for an ongoing plan.  Therefore, I have proposed the following plan:  1. Interventional pain management measures: She will keep me updated with her ongoing physical therapy, I briefly mentioned the possibility of Sprint PNS system Extensa dual-lead system to the bilateral medial cluneal nerves, DRG to the S2 and S3 dorsal ganglions, diagnostic and therapeutic bilateral L4-L5 transforaminal epidural steroid injections, ViaDisc (L4-L5), PRP, SCS  2. Pharmacological measures: Reviewed and discussed;   A. Patient takes gabapentin, Tylenol. Patient also takes alendronate (Fosamax)  3. Long-term rehabilitation efforts:  A. The patient does not have a history of falls.    B. Patient will start a comprehensive physical therapy program for Alter-G, neurodynamics, core strengthening, gluteal and abductor strengthening, ultrasound, ASTYM, E-STIM, myofascial release, cupping,  dry needling, home exercise program  C. Contrast therapy: Apply ice-packs for 15-20 minutes, followed by heating pads for 15-20 minutes to affected area   D. Start a low impact exercise program such as water therapy, swimming, yoga, Pilates  4. The patient has been instructed to contact my office with any questions or difficulties. The patient understands the plan and agrees to proceed accordingly.      Pain Medications                gabapentin (NEURONTIN) 300 MG capsule TAKE 1 CAPSULE BY MOUTH THREE TIMES DAILY             No orders of the defined types were placed in this encounter.       Please note that portions of this note were completed with a voice recognition program.   Any copied data in any portion of my note has been reviewed by myself and accurate.     The 21st Century Cures Act makes medical notes like this available to patients in the interest of transparency. This is a medical document intended as peer to peer communication. It is written in medical language and may contain abbreviations or verbiage that are unfamiliar. It may appear blunt or direct. Medical documents are intended to carry relevant information, facts as evident, and the clinical opinion of the practitioner.     POLLY Sethi    Patient Care Team:  Felicita Leslie DO as PCP - General (Family Medicine)  Luis Cuello MD as Consulting Physician (Ophthalmology)  Mesha Martinez MD as Consulting Physician (Pain Medicine)     No orders of the defined types were placed in this encounter.        Future Appointments   Date Time Provider Department Center   7/2/2024  2:00 PM Charis Robertson APRN [facility]

## 2024-07-05 ENCOUNTER — TELEPHONE (OUTPATIENT)
Dept: PAIN MEDICINE | Facility: CLINIC | Age: 64
End: 2024-07-05
Payer: MEDICARE

## 2024-07-05 DIAGNOSIS — G58.9 ENTRAPMENT NEUROPATHY: Primary | ICD-10-CM

## 2024-07-05 DIAGNOSIS — M53.3 SACROILIAC JOINT DYSFUNCTION OF BOTH SIDES: ICD-10-CM

## 2024-07-05 NOTE — TELEPHONE ENCOUNTER
Caller: MIHAELA    Relationship: SELF    Best call back number: [phone]    What orders are you requesting (i.e. lab or imaging): PT ORDER    Additional notes: REFERRAL SENT TO [facility]- PHONE NUMBER [phone]

## 2024-07-17 ENCOUNTER — TRANSCRIBE ORDERS (OUTPATIENT)
Dept: ADMINISTRATIVE | Facility: HOSPITAL | Age: 64
End: 2024-07-17
Payer: MEDICARE

## 2024-07-17 DIAGNOSIS — M81.0 AGE-RELATED OSTEOPOROSIS WITHOUT CURRENT PATHOLOGICAL FRACTURE: Primary | ICD-10-CM

## 2024-07-22 ENCOUNTER — TRANSCRIBE ORDERS (OUTPATIENT)
Dept: ADMINISTRATIVE | Facility: HOSPITAL | Age: 64
End: 2024-07-22
Payer: MEDICARE

## 2024-07-22 DIAGNOSIS — Z12.31 ENCOUNTER FOR SCREENING MAMMOGRAM FOR BREAST CANCER: Primary | ICD-10-CM

## 2024-07-23 LAB
NCCN CRITERIA FLAG: NORMAL
TYRER CUZICK SCORE: 5.1

## 2024-07-30 ENCOUNTER — HOSPITAL ENCOUNTER (OUTPATIENT)
Dept: BONE DENSITY | Facility: HOSPITAL | Age: 64
Discharge: HOME | End: 2024-07-30
Payer: MEDICARE

## 2024-07-30 ENCOUNTER — HOSPITAL ENCOUNTER (OUTPATIENT)
Facility: HOSPITAL | Age: 64
Discharge: HOME | End: 2024-07-30
Payer: MEDICARE

## 2024-07-30 DIAGNOSIS — Z12.31 ENCOUNTER FOR SCREENING MAMMOGRAM FOR BREAST CANCER: ICD-10-CM

## 2024-07-30 DIAGNOSIS — M81.0 AGE-RELATED OSTEOPOROSIS WITHOUT CURRENT PATHOLOGICAL FRACTURE: ICD-10-CM

## 2024-07-30 PROCEDURE — 77063 BREAST TOMOSYNTHESIS BI: CPT

## 2024-07-30 PROCEDURE — 77080 DXA BONE DENSITY AXIAL: CPT

## 2024-07-30 PROCEDURE — 77067 SCR MAMMO BI INCL CAD: CPT

## 2025-08-12 ENCOUNTER — TELEPHONE (OUTPATIENT)
Dept: PAIN MEDICINE | Facility: CLINIC | Age: 65
End: 2025-08-12
Payer: MEDICARE